# Patient Record
Sex: MALE | Race: BLACK OR AFRICAN AMERICAN | NOT HISPANIC OR LATINO | Employment: UNEMPLOYED | URBAN - METROPOLITAN AREA
[De-identification: names, ages, dates, MRNs, and addresses within clinical notes are randomized per-mention and may not be internally consistent; named-entity substitution may affect disease eponyms.]

---

## 2021-10-07 ENCOUNTER — OFFICE VISIT (OUTPATIENT)
Dept: UROLOGY | Facility: CLINIC | Age: 46
End: 2021-10-07
Payer: MEDICAID

## 2021-10-07 DIAGNOSIS — N52.9 ERECTILE DYSFUNCTION, UNSPECIFIED ERECTILE DYSFUNCTION TYPE: Primary | ICD-10-CM

## 2021-10-07 PROCEDURE — 99204 OFFICE O/P NEW MOD 45 MIN: CPT | Mod: S$GLB,,, | Performed by: NURSE PRACTITIONER

## 2021-10-07 PROCEDURE — 99204 PR OFFICE/OUTPT VISIT, NEW, LEVL IV, 45-59 MIN: ICD-10-PCS | Mod: S$GLB,,, | Performed by: NURSE PRACTITIONER

## 2021-10-08 LAB
E2: 18.8 PG/ML
LH: 5.31 MIU/ML
SHBG: 60.8 NMOL/L (ref 16.5–55.9)
TESTOST SERPL-MCNC: 357 NG/DL (ref 249–836)

## 2021-10-11 ENCOUNTER — TELEPHONE (OUTPATIENT)
Dept: UROLOGY | Facility: CLINIC | Age: 46
End: 2021-10-11

## 2021-11-29 ENCOUNTER — TELEPHONE (OUTPATIENT)
Dept: UROLOGY | Facility: CLINIC | Age: 46
End: 2021-11-29
Payer: MEDICAID

## 2021-12-09 RX ORDER — SILDENAFIL 100 MG/1
100 TABLET, FILM COATED ORAL
Qty: 15 TABLET | Refills: 2 | Status: SHIPPED | OUTPATIENT
Start: 2021-12-09 | End: 2022-01-06 | Stop reason: SDUPTHER

## 2022-01-06 ENCOUNTER — TELEPHONE (OUTPATIENT)
Dept: UROLOGY | Facility: CLINIC | Age: 47
End: 2022-01-06
Payer: MEDICAID

## 2022-01-06 RX ORDER — SILDENAFIL 100 MG/1
100 TABLET, FILM COATED ORAL
Qty: 15 TABLET | Refills: 2 | Status: SHIPPED | OUTPATIENT
Start: 2022-01-06 | End: 2022-02-10 | Stop reason: SDUPTHER

## 2022-01-06 NOTE — TELEPHONE ENCOUNTER
----- Message from Dominique Arvizu sent at 1/5/2022  3:48 PM CST -----    ----- Message -----  From: Radha Ng  Sent: 1/5/2022  10:20 AM CST  To: [facility] Staff    Type:  RX Refill Request    Who Called: pt  Refill or New Rx:refill  RX Name and Strength:sildenafiL (VIAGRA) 100 MG tablet  How is the patient currently taking it? (ex. 1XDay):?  Is this a 30 day or 90 day RX:he would like a 90 day supply  Preferred Pharmacy with phone number:.  [facility] [place] [facility] [address] [address]  Phone: [phone] Fax: [phone]  Local or Mail Order:local  Ordering Provider:Dr ELAINA Maxwell  Would the patient rather a call back or a response via [facility]? call  Best Call Back Number:his phone is broken  Additional Information: .    Thank you

## 2022-02-10 RX ORDER — SILDENAFIL 100 MG/1
100 TABLET, FILM COATED ORAL
Qty: 15 TABLET | Refills: 2 | Status: SHIPPED | OUTPATIENT
Start: 2022-02-10 | End: 2022-05-20

## 2022-02-10 NOTE — TELEPHONE ENCOUNTER
----- Message from Mehnaz Mckeon sent at 2/10/2022  9:25 AM CST -----  Contact: Patient  Type:  RX Refill Request    Who Called: Erik Patel  Refill or New Rx:refill  RX Name and Strength:sildenafiL (VIAGRA) 100 MG tablet  How is the patient currently taking it? (ex. 1XDay):1x as needed  Is this a 30 day or 90 day RX:90  Preferred Pharmacy with phone number:  [facility] [address] [facility] [address] [address]  Phone: [phone] Fax: [phone]  Local or Mail Order:local  Ordering Provider:Tone Maxwell  Would the patient rather a call back or a response via [facility]? Call back  Best Call Back Number:[phone]  Additional Information: Patient states he got only 15 pills his last refill.

## 2022-04-20 ENCOUNTER — TELEPHONE (OUTPATIENT)
Dept: UROLOGY | Facility: CLINIC | Age: 47
End: 2022-04-20
Payer: MEDICAID

## 2022-04-20 NOTE — TELEPHONE ENCOUNTER
Pt will keep his upcoming apt. ED----- Message from Gisell Gómez sent at 4/20/2022  9:59 AM CDT -----  Erik Patel stated that he lives far away and will have trouble getting transportation to his appointment on 05/12.     He would like to know if he can change his appointment to a virtual or audio visit instead. He said he really need his refills. Please give him a call back at [phone] (home)

## 2022-05-11 ENCOUNTER — TELEPHONE (OUTPATIENT)
Dept: UROLOGY | Facility: CLINIC | Age: 47
End: 2022-05-11
Payer: MEDICAID

## 2022-05-13 ENCOUNTER — TELEPHONE (OUTPATIENT)
Dept: UROLOGY | Facility: CLINIC | Age: 47
End: 2022-05-13
Payer: MEDICAID

## 2022-05-13 ENCOUNTER — OFFICE VISIT (OUTPATIENT)
Dept: UROLOGY | Facility: CLINIC | Age: 47
End: 2022-05-13
Payer: MEDICAID

## 2022-05-13 VITALS
DIASTOLIC BLOOD PRESSURE: 79 MMHG | SYSTOLIC BLOOD PRESSURE: 111 MMHG | HEART RATE: 91 BPM | RESPIRATION RATE: 18 BRPM | TEMPERATURE: 98 F

## 2022-05-13 DIAGNOSIS — N52.9 ERECTILE DYSFUNCTION, UNSPECIFIED ERECTILE DYSFUNCTION TYPE: Primary | ICD-10-CM

## 2022-05-13 PROCEDURE — 3074F PR MOST RECENT SYSTOLIC BLOOD PRESSURE < 130 MM HG: ICD-10-PCS | Mod: CPTII,S$GLB,, | Performed by: NURSE PRACTITIONER

## 2022-05-13 PROCEDURE — 1160F RVW MEDS BY RX/DR IN RCRD: CPT | Mod: CPTII,S$GLB,, | Performed by: NURSE PRACTITIONER

## 2022-05-13 PROCEDURE — 99213 OFFICE O/P EST LOW 20 MIN: CPT | Mod: S$GLB,,, | Performed by: NURSE PRACTITIONER

## 2022-05-13 PROCEDURE — 1159F PR MEDICATION LIST DOCUMENTED IN MEDICAL RECORD: ICD-10-PCS | Mod: CPTII,S$GLB,, | Performed by: NURSE PRACTITIONER

## 2022-05-13 PROCEDURE — 3074F SYST BP LT 130 MM HG: CPT | Mod: CPTII,S$GLB,, | Performed by: NURSE PRACTITIONER

## 2022-05-13 PROCEDURE — 3078F PR MOST RECENT DIASTOLIC BLOOD PRESSURE < 80 MM HG: ICD-10-PCS | Mod: CPTII,S$GLB,, | Performed by: NURSE PRACTITIONER

## 2022-05-13 PROCEDURE — 1159F MED LIST DOCD IN RCRD: CPT | Mod: CPTII,S$GLB,, | Performed by: NURSE PRACTITIONER

## 2022-05-13 PROCEDURE — 1160F PR REVIEW ALL MEDS BY PRESCRIBER/CLIN PHARMACIST DOCUMENTED: ICD-10-PCS | Mod: CPTII,S$GLB,, | Performed by: NURSE PRACTITIONER

## 2022-05-13 PROCEDURE — 3078F DIAST BP <80 MM HG: CPT | Mod: CPTII,S$GLB,, | Performed by: NURSE PRACTITIONER

## 2022-05-13 PROCEDURE — 99213 PR OFFICE/OUTPT VISIT, EST, LEVL III, 20-29 MIN: ICD-10-PCS | Mod: S$GLB,,, | Performed by: NURSE PRACTITIONER

## 2022-05-13 NOTE — PROGRESS NOTES
Subjective:       Patient ID: Erik Patel is a 47 y.o. male.    Chief Complaint: Erectile Dysfunction (Wanting a stronger ed medication.)      HPI: 39-year-old male, established patient, last seen October 2021.  Patient has erectile dysfunction.  He previously failed oral medications.  With start the patient on TriMix injections.  He was started on 30/0.5/10.  Patient states was working well for him.  However recently, it has become less and effective.  He has increased the amount he injects.  He started off on 10 units and has titrated up to 30 units.    Again the patient has failed oral medications.  He would like to try increasing dosage.  He has poor coverage for penile prosthesis.  No other urinary complaints at this time.         Past Medical History:   Past Medical History:   Diagnosis Date    ED (erectile dysfunction)        Past Surgical Historical:   Past Surgical History:   Procedure Laterality Date    HERNIA REPAIR          Medications:   Medication List with Changes/Refills   Current Medications    ALPROSTADIL 500 MCG/ML SOLN 50 MCG, PHENTOLAMINE 5 MG SOLR 5 MG, PAPAVERINE 30 MG/ML SOLN 30 MCG    by Intracavernosal route.    SILDENAFIL (VIAGRA) 100 MG TABLET    Take 1 tablet (100 mg total) by mouth as needed (prior to activity).        Past Social History:   Social History     Socioeconomic History    Marital status: Single   Tobacco Use    Smoking status: Current Every Day Smoker     Types: Cigarettes    Smokeless tobacco: Never Used   Substance and Sexual Activity    Alcohol use: Never    Drug use: Yes    Sexual activity: Not Currently       Allergies: Review of patient's allergies indicates:  No Known Allergies     Family History:   Family History   Problem Relation Age of Onset    No Known Problems Father     No Known Problems Mother         Review of Systems:  Review of Systems   Constitutional: Negative for activity change and appetite change.   HENT: Negative for congestion and  dental problem.    Respiratory: Negative for chest tightness and shortness of breath.    Cardiovascular: Negative for chest pain.   Gastrointestinal: Negative for abdominal distention and abdominal pain.   Genitourinary: Negative for decreased urine volume, difficulty urinating, dysuria, enuresis, flank pain, frequency, genital sores, hematuria, penile discharge, penile pain, penile swelling, scrotal swelling, testicular pain and urgency.   Musculoskeletal: Negative for back pain and neck pain.   Neurological: Negative for dizziness.   Hematological: Negative for adenopathy.   Psychiatric/Behavioral: Negative for agitation, behavioral problems and confusion.       Physical Exam:  Physical Exam  Vitals and nursing note reviewed.   Constitutional:       Appearance: He is well-developed.   HENT:      Head: Normocephalic.   Cardiovascular:      Rate and Rhythm: Normal rate and regular rhythm.      Heart sounds: Normal heart sounds.   Pulmonary:      Effort: Pulmonary effort is normal.      Breath sounds: Normal breath sounds.   Abdominal:      General: Bowel sounds are normal.      Palpations: Abdomen is soft.   Skin:     General: Skin is warm and dry.   Neurological:      Mental Status: He is alert and oriented to person, place, and time.       Urinalysis:  Normal    Assessment/Plan:   Erectile dysfunction:  Patient has been on TriMix injections.  He was on low-dose, 30/0.5/10.  He is having left affecting his even with increasing amount injected.  Will increase patient's TriMix.  Will increase to 30/1/20.  Patient will notify us if there is no improvement.  Also discussed vacuum pumps and penile rings.    Will plan follow-up in 1 year, sooner if needed.  Problem List Items Addressed This Visit    None     Visit Diagnoses     Erectile dysfunction, unspecified erectile dysfunction type    -  Primary    Relevant Orders    POCT Urinalysis (w/Micro Option)

## 2022-05-13 NOTE — TELEPHONE ENCOUNTER
----- Message from Marina Aviles sent at 5/13/2022  9:41 AM CDT -----  Regarding: same day appt access  Contact: Pt  Type:  Same Day Appointment Request    Caller is requesting a same day appointment.  Caller declined first available appointment listed below.    Name of Caller: Erik Patel   When is the first available appointment?   Symptoms: 6 mo fu  Best Call Back Number: [phone]  Additional Information:  Pt states that someone from the office called him on 05/06/22 to reschedule his appt for today at 2:40pm. Pt states that he took off from work today and now he finds out that is was scheduled for yesterday. Pt is requesting an appt for today around 2pm. Pt is unable to come in any earlier due to transportation and he lives in [place].

## 2022-06-10 ENCOUNTER — HOSPITAL ENCOUNTER (EMERGENCY)
Facility: HOSPITAL | Age: 47
Discharge: HOME | End: 2022-06-10
Attending: SPECIALIST
Payer: MEDICAID

## 2022-06-10 VITALS
WEIGHT: 235 LBS | BODY MASS INDEX: 32.9 KG/M2 | SYSTOLIC BLOOD PRESSURE: 131 MMHG | TEMPERATURE: 98 F | HEIGHT: 71 IN | OXYGEN SATURATION: 99 % | DIASTOLIC BLOOD PRESSURE: 77 MMHG | RESPIRATION RATE: 20 BRPM | HEART RATE: 93 BPM

## 2022-06-10 DIAGNOSIS — R10.31 RIGHT GROIN PAIN: Primary | ICD-10-CM

## 2022-06-10 PROCEDURE — 25000003 PHARM REV CODE 250: Performed by: SPECIALIST

## 2022-06-10 PROCEDURE — 99283 EMERGENCY DEPT VISIT LOW MDM: CPT | Mod: 25

## 2022-06-10 RX ORDER — KETOROLAC TROMETHAMINE 10 MG/1
10 TABLET, FILM COATED ORAL
Status: COMPLETED | OUTPATIENT
Start: 2022-06-10 | End: 2022-06-10

## 2022-06-10 RX ORDER — CYCLOBENZAPRINE HCL 10 MG
10 TABLET ORAL
Status: COMPLETED | OUTPATIENT
Start: 2022-06-10 | End: 2022-06-10

## 2022-06-10 RX ORDER — DICLOFENAC SODIUM 50 MG/1
50 TABLET, DELAYED RELEASE ORAL 3 TIMES DAILY PRN
Qty: 30 TABLET | Refills: 0 | OUTPATIENT
Start: 2022-06-10 | End: 2022-09-04

## 2022-06-10 RX ADMIN — KETOROLAC TROMETHAMINE 10 MG: 10 TABLET, FILM COATED ORAL at 09:06

## 2022-06-10 RX ADMIN — CYCLOBENZAPRINE 10 MG: 10 TABLET, FILM COATED ORAL at 09:06

## 2022-06-11 NOTE — ED PROVIDER NOTES
Encounter Date: 6/10/2022       History     Chief Complaint   Patient presents with    Groin Pain     Right groin pain onset last night pt states on going process for past three years.     Patient reports a right inguinal hernia repair 3 years ago with mesh and has had pain on and off for 3 years; no fever or chills and no bulging at the site of the repair    The history is provided by the patient.     Review of patient's allergies indicates:  No Known Allergies  Past Medical History:   Diagnosis Date    ED (erectile dysfunction)      Past Surgical History:   Procedure Laterality Date    HERNIA REPAIR       Family History   Problem Relation Age of Onset    No Known Problems Father     No Known Problems Mother      Social History     Tobacco Use    Smoking status: Current Every Day Smoker     Types: Cigarettes    Smokeless tobacco: Never Used   Substance Use Topics    Alcohol use: Never    Drug use: Yes     Review of Systems   Constitutional: Negative for fever.   HENT: Negative for sore throat.    Respiratory: Negative for shortness of breath.    Cardiovascular: Negative for chest pain.   Gastrointestinal: Negative for nausea.        Right groin pain   Genitourinary: Negative for dysuria.   Musculoskeletal: Negative for back pain.   Skin: Negative for rash.   Neurological: Negative for weakness.   Hematological: Does not bruise/bleed easily.       Physical Exam     Initial Vitals [06/10/22 1926]   BP Pulse Resp Temp SpO2   131/77 93 20 98.1 °F (36.7 °C) 99 %      MAP       --         Physical Exam    Nursing note and vitals reviewed.  Constitutional: He appears well-developed and well-nourished.   HENT:   Head: Normocephalic and atraumatic.   Eyes: EOM are normal. Pupils are equal, round, and reactive to light.   Cardiovascular: Normal rate, regular rhythm and normal heart sounds.   Pulmonary/Chest: Breath sounds normal.   Abdominal: Abdomen is soft. Bowel sounds are normal.   Right groin tender to  palpation, no swelling or mass palpated, no erythema   Musculoskeletal:         General: Normal range of motion.     Neurological: He is alert and oriented to person, place, and time.   Skin: Skin is warm and dry.         ED Course   Procedures  Labs Reviewed - No data to display       Imaging Results    None          Medications   ketorolac tablet 10 mg (10 mg Oral Given 6/10/22 2116)   cyclobenzaprine tablet 10 mg (10 mg Oral Given 6/10/22 2116)                          Clinical Impression:   Final diagnoses:  [R10.31] Right groin pain (Primary)          ED Disposition Condition    Discharge Stable        ED Prescriptions     Medication Sig Dispense Start Date End Date Auth. Provider    diclofenac (VOLTAREN) 50 MG EC tablet Take 1 tablet (50 mg total) by mouth 3 (three) times daily as needed. 30 tablet 6/10/2022  Cisco Wilburn MD        Follow-up Information     Follow up With Specialties Details Why Contact Info    MONIE Vásquez Nurse Practitioner In 1 week  [address] [place] [id number] [phone]             Cisco Wilburn MD  06/11/22 0395

## 2022-06-13 DIAGNOSIS — Z00.00 WELLNESS EXAMINATION: Primary | ICD-10-CM

## 2022-06-14 ENCOUNTER — HOSPITAL ENCOUNTER (EMERGENCY)
Facility: HOSPITAL | Age: 47
Discharge: HOME | End: 2022-06-14
Attending: FAMILY MEDICINE
Payer: MEDICAID

## 2022-06-14 VITALS
HEIGHT: 71 IN | SYSTOLIC BLOOD PRESSURE: 113 MMHG | BODY MASS INDEX: 32.48 KG/M2 | OXYGEN SATURATION: 100 % | WEIGHT: 232 LBS | TEMPERATURE: 98 F | HEART RATE: 67 BPM | RESPIRATION RATE: 20 BRPM | DIASTOLIC BLOOD PRESSURE: 74 MMHG

## 2022-06-14 DIAGNOSIS — R10.31 RIGHT GROIN PAIN: Primary | ICD-10-CM

## 2022-06-14 DIAGNOSIS — R91.1 PULMONARY NODULE, RIGHT: ICD-10-CM

## 2022-06-14 LAB
ALBUMIN SERPL-MCNC: 4.1 GM/DL (ref 3.5–5)
ALBUMIN/GLOB SERPL: 1.6 RATIO (ref 1.1–2)
ALP SERPL-CCNC: 75 UNIT/L (ref 40–150)
ALT SERPL-CCNC: 20 UNIT/L (ref 0–55)
APPEARANCE UR: CLEAR
AST SERPL-CCNC: 18 UNIT/L (ref 5–34)
BACTERIA #/AREA URNS AUTO: NORMAL /HPF
BASOPHILS # BLD AUTO: 0.05 X10(3)/MCL (ref 0–0.2)
BASOPHILS NFR BLD AUTO: 0.7 %
BILIRUB UR QL STRIP.AUTO: NEGATIVE MG/DL
BILIRUBIN DIRECT+TOT PNL SERPL-MCNC: 0.5 MG/DL
BUN SERPL-MCNC: 16.2 MG/DL (ref 8.9–20.6)
CALCIUM SERPL-MCNC: 9.3 MG/DL (ref 8.4–10.2)
CHLORIDE SERPL-SCNC: 108 MMOL/L (ref 98–107)
CO2 SERPL-SCNC: 27 MMOL/L (ref 22–29)
COLOR UR AUTO: YELLOW
CREAT SERPL-MCNC: 0.84 MG/DL (ref 0.73–1.18)
EOSINOPHIL # BLD AUTO: 0.25 X10(3)/MCL (ref 0–0.9)
EOSINOPHIL NFR BLD AUTO: 3.6 %
ERYTHROCYTE [DISTWIDTH] IN BLOOD BY AUTOMATED COUNT: 12.6 % (ref 11.5–17)
GLOBULIN SER-MCNC: 2.6 GM/DL (ref 2.4–3.5)
GLUCOSE SERPL-MCNC: 104 MG/DL (ref 74–100)
GLUCOSE UR QL STRIP.AUTO: NEGATIVE MG/DL
HCT VFR BLD AUTO: 41.6 % (ref 42–52)
HGB BLD-MCNC: 14.2 GM/DL (ref 14–18)
IMM GRANULOCYTES # BLD AUTO: 0.01 X10(3)/MCL (ref 0–0.02)
IMM GRANULOCYTES NFR BLD AUTO: 0.1 % (ref 0–0.43)
KETONES UR QL STRIP.AUTO: NEGATIVE MG/DL
LEUKOCYTE ESTERASE UR QL STRIP.AUTO: NEGATIVE UNIT/L
LYMPHOCYTES # BLD AUTO: 1.9 X10(3)/MCL (ref 0.6–4.6)
LYMPHOCYTES NFR BLD AUTO: 27 %
MCH RBC QN AUTO: 28.7 PG (ref 27–31)
MCHC RBC AUTO-ENTMCNC: 34.1 MG/DL (ref 33–36)
MCV RBC AUTO: 84 FL (ref 80–94)
MONOCYTES # BLD AUTO: 0.55 X10(3)/MCL (ref 0.1–1.3)
MONOCYTES NFR BLD AUTO: 7.8 %
NEUTROPHILS # BLD AUTO: 4.3 X10(3)/MCL (ref 2.1–9.2)
NEUTROPHILS NFR BLD AUTO: 60.8 %
NITRITE UR QL STRIP.AUTO: NEGATIVE
PH UR STRIP.AUTO: 6.5 [PH]
PLATELET # BLD AUTO: 282 X10(3)/MCL (ref 130–400)
PMV BLD AUTO: 9.3 FL (ref 9.4–12.4)
POTASSIUM SERPL-SCNC: 4.4 MMOL/L (ref 3.5–5.1)
PROT SERPL-MCNC: 6.7 GM/DL (ref 6.4–8.3)
PROT UR QL STRIP.AUTO: NEGATIVE MG/DL
RBC # BLD AUTO: 4.95 X10(6)/MCL (ref 4.7–6.1)
RBC #/AREA URNS AUTO: NORMAL /HPF
RBC UR QL AUTO: NEGATIVE UNIT/L
SODIUM SERPL-SCNC: 142 MMOL/L (ref 136–145)
SP GR UR STRIP.AUTO: >=1.03
SQUAMOUS #/AREA URNS AUTO: NORMAL /LPF
UROBILINOGEN UR STRIP-ACNC: 1 MG/DL
WBC # SPEC AUTO: 7 X10(3)/MCL (ref 4.5–11.5)
WBC #/AREA URNS AUTO: NORMAL /HPF

## 2022-06-14 PROCEDURE — 80053 COMPREHEN METABOLIC PANEL: CPT | Performed by: FAMILY MEDICINE

## 2022-06-14 PROCEDURE — 81001 URINALYSIS AUTO W/SCOPE: CPT | Performed by: FAMILY MEDICINE

## 2022-06-14 PROCEDURE — 63600175 PHARM REV CODE 636 W HCPCS: Performed by: FAMILY MEDICINE

## 2022-06-14 PROCEDURE — 25500020 PHARM REV CODE 255: Performed by: FAMILY MEDICINE

## 2022-06-14 PROCEDURE — 85025 COMPLETE CBC W/AUTO DIFF WBC: CPT | Performed by: FAMILY MEDICINE

## 2022-06-14 PROCEDURE — 99285 EMERGENCY DEPT VISIT HI MDM: CPT | Mod: 25

## 2022-06-14 PROCEDURE — 36415 COLL VENOUS BLD VENIPUNCTURE: CPT | Performed by: FAMILY MEDICINE

## 2022-06-14 RX ORDER — KETOROLAC TROMETHAMINE 30 MG/ML
30 INJECTION, SOLUTION INTRAMUSCULAR; INTRAVENOUS
Status: COMPLETED | OUTPATIENT
Start: 2022-06-14 | End: 2022-06-14

## 2022-06-14 RX ORDER — KETOROLAC TROMETHAMINE 30 MG/ML
30 INJECTION, SOLUTION INTRAMUSCULAR; INTRAVENOUS
Status: DISCONTINUED | OUTPATIENT
Start: 2022-06-14 | End: 2022-06-14

## 2022-06-14 RX ORDER — KETOROLAC TROMETHAMINE 10 MG/1
10 TABLET, FILM COATED ORAL EVERY 6 HOURS
Qty: 15 TABLET | Refills: 0 | Status: SHIPPED | OUTPATIENT
Start: 2022-06-14 | End: 2022-06-19

## 2022-06-14 RX ADMIN — KETOROLAC TROMETHAMINE 30 MG: 30 INJECTION, SOLUTION INTRAMUSCULAR at 08:06

## 2022-06-14 RX ADMIN — IOPAMIDOL 100 ML: 755 INJECTION, SOLUTION INTRAVENOUS at 09:06

## 2022-06-14 NOTE — DISCHARGE INSTRUCTIONS
Follow-up with his surgeon on the right groin pain for possible irritation from the mesh lining of the hernia repair.  Follow-up with primary care for repeat CT scan on pulmonary nodule and follow-up on that.

## 2022-06-14 NOTE — ED PROVIDER NOTES
Encounter Date: 6/14/2022       History     Chief Complaint   Patient presents with    Abdominal Pain    rlq abd/groin pain     C/o pain to rlq/groin for pain off and on for last 3 years/ worsening  pain/ same as when he had hernia but no buldge/ came Friday but pain is not better       Abdominal Pain  The current episode started several weeks ago. The problem has been gradually worsening. The abdominal pain is located in the groin. The abdominal pain does not radiate. The severity of the abdominal pain is 5/10. The abdominal pain is relieved by nothing. The other symptoms of the illness do not include fever, shortness of breath, nausea or dysuria.   The patient states that she believes she is currently not pregnant. The patient has not had a change in bowel habit. Symptoms associated with the illness do not include heartburn, constipation, urgency, hematuria, frequency or back pain. Significant associated medical issues include diverticulitis. Significant associated medical issues do not include inflammatory bowel disease or gallstones.     Review of patient's allergies indicates:  No Known Allergies  Past Medical History:   Diagnosis Date    ED (erectile dysfunction)      Past Surgical History:   Procedure Laterality Date    HERNIA REPAIR       Family History   Problem Relation Age of Onset    No Known Problems Father     No Known Problems Mother      Social History     Tobacco Use    Smoking status: Current Every Day Smoker     Types: Cigarettes    Smokeless tobacco: Never Used   Substance Use Topics    Alcohol use: Never    Drug use: Yes     Review of Systems   Constitutional: Negative for fever.   HENT: Negative for sore throat.    Respiratory: Negative for shortness of breath.    Cardiovascular: Negative for chest pain.   Gastrointestinal: Positive for abdominal pain. Negative for constipation, heartburn and nausea.   Genitourinary: Negative for dysuria, frequency, hematuria and urgency.    Musculoskeletal: Negative for back pain.   Skin: Negative for rash.   Neurological: Negative for weakness.   Hematological: Does not bruise/bleed easily.   All other systems reviewed and are negative.      Physical Exam     Initial Vitals [06/14/22 0744]   BP Pulse Resp Temp SpO2   (!) 144/84 89 20 98.4 °F (36.9 °C) 99 %      MAP       --         Physical Exam    Nursing note and vitals reviewed.  Constitutional: He appears well-developed and well-nourished. He is active.   HENT:   Head: Normocephalic and atraumatic.   Eyes: Conjunctivae, EOM and lids are normal. Pupils are equal, round, and reactive to light.   Neck: Trachea normal and phonation normal. Neck supple. No thyroid mass present.   Normal range of motion.  Cardiovascular: Normal rate, regular rhythm, normal heart sounds and normal pulses.   Pulmonary/Chest: Breath sounds normal.   Abdominal: Abdomen is soft. Bowel sounds are normal.   Genitourinary:    Genitourinary Comments: No palpable hernia on the right or left side     Musculoskeletal:         General: Normal range of motion.      Cervical back: Normal range of motion and neck supple.     Neurological: He is alert and oriented to person, place, and time. He has normal strength and normal reflexes.   Skin: Skin is warm, dry and intact.   Psychiatric: He has a normal mood and affect. His speech is normal and behavior is normal. Judgment and thought content normal. Cognition and memory are normal.         ED Course   Procedures  Labs Reviewed   COMPREHENSIVE METABOLIC PANEL - Abnormal; Notable for the following components:       Result Value    Chloride 108 (*)     Glucose Level 104 (*)     All other components within normal limits   CBC WITH DIFFERENTIAL - Abnormal; Notable for the following components:    Hct 41.6 (*)     MPV 9.3 (*)     All other components within normal limits   URINALYSIS, MICROSCOPIC - Normal   URINALYSIS, REFLEX TO URINE CULTURE   CBC W/ AUTO DIFFERENTIAL    Narrative:     The  following orders were created for panel order CBC auto differential.  Procedure                               Abnormality         Status                     ---------                               -----------         ------                     CBC with Differential[[id number]]        Abnormal            Final result                 Please view results for these tests on the individual orders.          Imaging Results          CT Abdomen Pelvis With Contrast (Final result)  Result time 06/14/22 09:55:08    Final result by Mere Hill MD (06/14/22 09:55:08)                 Impression:      1. Small to moderate fat containing umbilical hernia  2. Postsurgical changes of right inguinal hernia repair.  3. Trace fat at the medial right inguinal canal is nonspecific.  This appears to been have described on the prior CT report as mild fatty protrusion at the inguinal region.  4. Nonspecific prostatic calcifications  5. Left lower lobe pulmonary nodule..  See Fleischner recommendations below:  Fleischner criteria for nodules 6-8 mm:    Single nodule in a low risk patient- CT at 6-12 month, then consider CT at 18-24 months    Single nodule in a high risk patient- CT at 6-12 months and 18-24 months.    Reference: 2017 Fleischner Society Guidelines for Management of Incidentally Discovered Pulmonary Nodules.        Electronically signed by: Mere Hill  Date:    06/14/2022  Time:    09:55             Narrative:    EXAMINATION:  CT ABDOMEN PELVIS WITH CONTRAST    CLINICAL HISTORY:  RLQ abdominal pain (Age >= 14y);r/o hernia;  abdomen and groin pain intermittently x3 years.    TECHNIQUE:  Helically acquired images with axial, sagittal and coronal reformations were obtained from the lung bases to the pubic symphysis after the IV administration of contrast.    Automated tube current modulation, weight-based exposure dosing, and/or iterative reconstruction technique utilized to reach lowest reasonably achievable  exposure rate.    DLP: 1486 mGy*cm    COMPARISON:  Report from CT pelvis dated 02/20/2019 was reviewed.  These images were not available in epic PACS for viewing.    FINDINGS:  HEART: Normal in size. No pericardial effusion.    LUNG BASES: 6 mm subpleural left lower lobe pulmonary nodule.    LIVER: Normal attenuation. No appreciable focal hepatic lesion.    BILIARY: No calcified gallstones.    PANCREAS: No inflammatory change.    SPLEEN: Normal in size    ADRENALS: No mass.    KIDNEYS/URETERS: Subcentimeter right renal cortical hypodensities are too small to accurately characterize.  No hydronephrosis.  No perinephric collection.  Kidneys enhance symmetrically.    GI TRACT/MESENTERY:  No evidence of bowel obstruction or inflammation. The appendix is normal.  Normal CT appearance of the colon.    PERITONEUM: No free fluid.No free air.    LYMPH NODES: No enlarged lymph nodes by size criteria.    VASCULATURE: Mild aortoiliac atherosclerosis without aneurysm.    BLADDER: Normal appearance given degree of distention.    REPRODUCTIVE ORGANS: There are small prostatic calcifications.    SOFT TISSUES: Small to moderate fat containing umbilical hernia.  There are postsurgical changes in the right inguinal region compatible with hernia repair.  Trace fat at the medial inguinal ring on the right is nonspecific.    BONES: No acute osseous abnormality.  Mild lower lumbar spondylosis.                                 Medications   ketorolac injection 30 mg (30 mg Intravenous Given 6/14/22 0753)   iopamidoL (ISOVUE-370) injection 100 mL (100 mLs Intravenous Given 6/14/22 0935)                          Clinical Impression:   Final diagnoses:  [R10.31] Right groin pain (Primary)  [R91.1] Pulmonary nodule, right          ED Disposition Condition    Discharge Stable        ED Prescriptions     Medication Sig Dispense Start Date End Date Auth. Provider    ketorolac (TORADOL) 10 mg tablet Take 1 tablet (10 mg total) by mouth every 6  (six) hours. for 5 days 15 tablet 6/14/2022 6/19/2022 Josse Roth MD        Follow-up Information     Follow up With Specialties Details Why Contact Info    [facility] - Emergency Dept Emergency Medicine  If symptoms worsen [address] [facility] [address] [facility] [id number]  [phone]           Josse Roth MD  06/14/22 1017

## 2022-06-20 ENCOUNTER — LAB VISIT (OUTPATIENT)
Dept: LAB | Facility: HOSPITAL | Age: 47
End: 2022-06-20
Attending: NURSE PRACTITIONER
Payer: MEDICAID

## 2022-06-20 DIAGNOSIS — Z00.00 WELLNESS EXAMINATION: ICD-10-CM

## 2022-06-20 LAB
ALBUMIN SERPL-MCNC: 4 GM/DL (ref 3.5–5)
ALBUMIN/GLOB SERPL: 1.4 RATIO (ref 1.1–2)
ALP SERPL-CCNC: 58 UNIT/L (ref 40–150)
ALT SERPL-CCNC: 23 UNIT/L (ref 0–55)
AST SERPL-CCNC: 18 UNIT/L (ref 5–34)
BASOPHILS # BLD AUTO: 0.05 X10(3)/MCL (ref 0–0.2)
BASOPHILS NFR BLD AUTO: 0.7 %
BILIRUBIN DIRECT+TOT PNL SERPL-MCNC: 0.7 MG/DL
BUN SERPL-MCNC: 15.9 MG/DL (ref 8.9–20.6)
CALCIUM SERPL-MCNC: 9.1 MG/DL (ref 8.4–10.2)
CHLORIDE SERPL-SCNC: 106 MMOL/L (ref 98–107)
CHOLEST SERPL-MCNC: 166 MG/DL
CHOLEST/HDLC SERPL: 2 {RATIO} (ref 0–5)
CO2 SERPL-SCNC: 27 MMOL/L (ref 22–29)
CREAT SERPL-MCNC: 0.86 MG/DL (ref 0.73–1.18)
DEPRECATED CALCIDIOL+CALCIFEROL SERPL-MC: 19.9 NG/ML (ref 30–80)
EOSINOPHIL # BLD AUTO: 0.15 X10(3)/MCL (ref 0–0.9)
EOSINOPHIL NFR BLD AUTO: 2.2 %
ERYTHROCYTE [DISTWIDTH] IN BLOOD BY AUTOMATED COUNT: 12.5 % (ref 11.5–17)
EST. AVERAGE GLUCOSE BLD GHB EST-MCNC: 76.7 MG/DL
GLOBULIN SER-MCNC: 2.8 GM/DL (ref 2.4–3.5)
GLUCOSE SERPL-MCNC: 82 MG/DL (ref 74–100)
HBA1C MFR BLD: 4.3 %
HCT VFR BLD AUTO: 40.2 % (ref 42–52)
HDLC SERPL-MCNC: 69 MG/DL (ref 35–60)
HGB BLD-MCNC: 13.9 GM/DL (ref 14–18)
IMM GRANULOCYTES # BLD AUTO: 0.01 X10(3)/MCL (ref 0–0.02)
IMM GRANULOCYTES NFR BLD AUTO: 0.1 % (ref 0–0.43)
LDLC SERPL CALC-MCNC: 86 MG/DL (ref 50–140)
LYMPHOCYTES # BLD AUTO: 1.61 X10(3)/MCL (ref 0.6–4.6)
LYMPHOCYTES NFR BLD AUTO: 24.1 %
MCH RBC QN AUTO: 29 PG (ref 27–31)
MCHC RBC AUTO-ENTMCNC: 34.6 MG/DL (ref 33–36)
MCV RBC AUTO: 83.8 FL (ref 80–94)
MONOCYTES # BLD AUTO: 0.51 X10(3)/MCL (ref 0.1–1.3)
MONOCYTES NFR BLD AUTO: 7.6 %
NEUTROPHILS # BLD AUTO: 4.4 X10(3)/MCL (ref 2.1–9.2)
NEUTROPHILS NFR BLD AUTO: 65.3 %
PLATELET # BLD AUTO: 254 X10(3)/MCL (ref 130–400)
PMV BLD AUTO: 8.7 FL (ref 9.4–12.4)
POTASSIUM SERPL-SCNC: 4 MMOL/L (ref 3.5–5.1)
PROT SERPL-MCNC: 6.8 GM/DL (ref 6.4–8.3)
RBC # BLD AUTO: 4.8 X10(6)/MCL (ref 4.7–6.1)
SODIUM SERPL-SCNC: 141 MMOL/L (ref 136–145)
TRIGL SERPL-MCNC: 57 MG/DL (ref 34–140)
TSH SERPL-ACNC: 1.69 UIU/ML (ref 0.35–4.94)
VLDLC SERPL CALC-MCNC: 11 MG/DL
WBC # SPEC AUTO: 6.7 X10(3)/MCL (ref 4.5–11.5)

## 2022-06-20 PROCEDURE — 80061 LIPID PANEL: CPT

## 2022-06-20 PROCEDURE — 84443 ASSAY THYROID STIM HORMONE: CPT

## 2022-06-20 PROCEDURE — 82306 VITAMIN D 25 HYDROXY: CPT

## 2022-06-20 PROCEDURE — 83036 HEMOGLOBIN GLYCOSYLATED A1C: CPT

## 2022-06-20 PROCEDURE — 80053 COMPREHEN METABOLIC PANEL: CPT

## 2022-06-20 PROCEDURE — 36415 COLL VENOUS BLD VENIPUNCTURE: CPT

## 2022-06-20 PROCEDURE — 85025 COMPLETE CBC W/AUTO DIFF WBC: CPT

## 2022-06-21 ENCOUNTER — OFFICE VISIT (OUTPATIENT)
Dept: FAMILY MEDICINE | Facility: CLINIC | Age: 47
End: 2022-06-21
Payer: MEDICAID

## 2022-06-21 VITALS
HEIGHT: 71 IN | HEART RATE: 77 BPM | SYSTOLIC BLOOD PRESSURE: 106 MMHG | WEIGHT: 239 LBS | RESPIRATION RATE: 18 BRPM | BODY MASS INDEX: 33.46 KG/M2 | OXYGEN SATURATION: 98 % | TEMPERATURE: 98 F | DIASTOLIC BLOOD PRESSURE: 73 MMHG

## 2022-06-21 DIAGNOSIS — E55.9 VITAMIN D DEFICIENCY: ICD-10-CM

## 2022-06-21 DIAGNOSIS — K40.90 RIGHT INGUINAL HERNIA: Primary | ICD-10-CM

## 2022-06-21 DIAGNOSIS — Z00.00 WELLNESS EXAMINATION: ICD-10-CM

## 2022-06-21 PROCEDURE — 3008F PR BODY MASS INDEX (BMI) DOCUMENTED: ICD-10-PCS | Mod: CPTII,,, | Performed by: NURSE PRACTITIONER

## 2022-06-21 PROCEDURE — 3078F DIAST BP <80 MM HG: CPT | Mod: CPTII,,, | Performed by: NURSE PRACTITIONER

## 2022-06-21 PROCEDURE — 3008F BODY MASS INDEX DOCD: CPT | Mod: CPTII,,, | Performed by: NURSE PRACTITIONER

## 2022-06-21 PROCEDURE — 1159F PR MEDICATION LIST DOCUMENTED IN MEDICAL RECORD: ICD-10-PCS | Mod: CPTII,,, | Performed by: NURSE PRACTITIONER

## 2022-06-21 PROCEDURE — 99386 PR PREVENTIVE VISIT,NEW,40-64: ICD-10-PCS | Mod: ,,, | Performed by: NURSE PRACTITIONER

## 2022-06-21 PROCEDURE — 3074F PR MOST RECENT SYSTOLIC BLOOD PRESSURE < 130 MM HG: ICD-10-PCS | Mod: CPTII,,, | Performed by: NURSE PRACTITIONER

## 2022-06-21 PROCEDURE — 1159F MED LIST DOCD IN RCRD: CPT | Mod: CPTII,,, | Performed by: NURSE PRACTITIONER

## 2022-06-21 PROCEDURE — 99386 PREV VISIT NEW AGE 40-64: CPT | Mod: ,,, | Performed by: NURSE PRACTITIONER

## 2022-06-21 PROCEDURE — 3078F PR MOST RECENT DIASTOLIC BLOOD PRESSURE < 80 MM HG: ICD-10-PCS | Mod: CPTII,,, | Performed by: NURSE PRACTITIONER

## 2022-06-21 PROCEDURE — 3074F SYST BP LT 130 MM HG: CPT | Mod: CPTII,,, | Performed by: NURSE PRACTITIONER

## 2022-06-21 RX ORDER — ASPIRIN 325 MG
50000 TABLET, DELAYED RELEASE (ENTERIC COATED) ORAL WEEKLY
Qty: 12 CAPSULE | Refills: 0 | Status: SHIPPED | OUTPATIENT
Start: 2022-06-21 | End: 2022-09-07

## 2022-06-21 RX ORDER — KETOROLAC TROMETHAMINE 10 MG/1
10 TABLET, FILM COATED ORAL EVERY 6 HOURS PRN
COMMUNITY
End: 2022-07-23

## 2022-06-21 NOTE — PROGRESS NOTES
Subjective:       Patient ID: Erik Patel is a 47 y.o. male.    Chief Complaint: Follow-up (ED visit on 6/10 and 6/14/2022 r/t R groin and RLQ abd pain. Pt reports continued pain to area.) and Establish Care    This is a 47-year-old male presents to clinic to establish care.  Patient was seen in the ED with complaints of pain to right inguinal area.  CT abdomen ordered by ER reported postsurgical  changes to right inguinal hernia repair.  Patient presents requesting referral.  Patient denies any changes with bowel.    Review of Systems   Constitutional: Negative.    HENT: Negative.    Eyes: Negative.    Respiratory: Negative.    Cardiovascular: Negative.    Gastrointestinal:        Pain to right inguinal area   Endocrine: Negative.    Genitourinary: Negative.    Musculoskeletal: Negative.    Integumentary:  Negative.   Allergic/Immunologic: Negative.    Neurological: Negative.    Hematological: Negative.    Psychiatric/Behavioral: Negative.          Objective:      Physical Exam  Vitals and nursing note reviewed.   Constitutional:       Appearance: Normal appearance.   HENT:      Head: Normocephalic and atraumatic.      Right Ear: Ear canal and external ear normal.      Left Ear: Ear canal and external ear normal.      Nose: Nose normal.      Mouth/Throat:      Mouth: Mucous membranes are moist.      Pharynx: Oropharynx is clear.   Eyes:      Extraocular Movements: Extraocular movements intact.      Conjunctiva/sclera: Conjunctivae normal.      Pupils: Pupils are equal, round, and reactive to light.   Cardiovascular:      Rate and Rhythm: Normal rate and regular rhythm.      Pulses: Normal pulses.      Heart sounds: Normal heart sounds.   Pulmonary:      Effort: Pulmonary effort is normal.      Breath sounds: Normal breath sounds.   Abdominal:      General: Abdomen is flat. Bowel sounds are normal.      Palpations: Abdomen is soft.      Tenderness: There is abdominal tenderness.      Comments: Tenderness to  right inguinal area with palpation.  No hernia seen.   Musculoskeletal:         General: Normal range of motion.      Cervical back: Normal range of motion and neck supple.   Skin:     General: Skin is warm and dry.      Capillary Refill: Capillary refill takes less than 2 seconds.   Neurological:      General: No focal deficit present.      Mental Status: He is alert and oriented to person, place, and time. Mental status is at baseline.   Psychiatric:         Mood and Affect: Mood normal.         Behavior: Behavior normal.         Thought Content: Thought content normal.         Judgment: Judgment normal.         Assessment:       Problem List Items Addressed This Visit        Endocrine    Vitamin D deficiency       GI    Right inguinal hernia - Primary     Patient reports right inguinal hernia repair done in 2015.  Patient presents with complaints pain and burning to right inguinal area.  CT abdomen performed in ER shows postsurgical changes of right inguinal hernia repair.  Referral sent to General surgery.           Relevant Orders    Ambulatory referral/consult to General Surgery       Other    Wellness examination          Plan:       Return to clinic in 6 months for follow-up appointment.  Follow-up CT lung screening due in 6 months.

## 2022-06-22 ENCOUNTER — PATIENT MESSAGE (OUTPATIENT)
Dept: ADMINISTRATIVE | Facility: HOSPITAL | Age: 47
End: 2022-06-22
Payer: MEDICAID

## 2022-06-23 ENCOUNTER — TELEPHONE (OUTPATIENT)
Dept: UROLOGY | Facility: CLINIC | Age: 47
End: 2022-06-23
Payer: MEDICAID

## 2022-06-23 PROBLEM — K40.90 RIGHT INGUINAL HERNIA: Status: ACTIVE | Noted: 2022-06-23

## 2022-06-23 PROBLEM — E55.9 VITAMIN D DEFICIENCY: Status: ACTIVE | Noted: 2022-06-23

## 2022-06-23 PROBLEM — Z00.00 WELLNESS EXAMINATION: Status: ACTIVE | Noted: 2022-06-23

## 2022-06-23 NOTE — TELEPHONE ENCOUNTER
----- Message from Anali Jackson sent at 6/23/2022  4:30 PM CDT -----  Contact: Patient  Patient need a 90 day supply of the below RX called in    Patient does not have a ride all the time so he need a 90 day supply and more than 2 refills. Please call into pharmacy ASAP    Patient cannot go to [facility] every 2 weeks he does not have a ride.  Call in to the below Pharmacy      sildenafiL (VIAGRA) 100 MG tablet            [facility]  [address] [address]        Call back #  for patient  [phone]

## 2022-06-23 NOTE — TELEPHONE ENCOUNTER
Pt wants #30 tablets a month of 100mg and wants it as 90day. I advised it should not be taken daily and with tri mix and asked about ov notes having oral meds  not working. Pt states he can not afford gas to go to pharmacy so much and he has always been on daily viagra.. I told pt I would send message to provider.

## 2022-06-23 NOTE — ASSESSMENT & PLAN NOTE
Patient reports right inguinal hernia repair done in 2015.  Patient presents with complaints pain and burning to right inguinal area.  CT abdomen performed in ER shows postsurgical changes of right inguinal hernia repair.  Referral sent to General surgery.

## 2022-06-29 RX ORDER — SILDENAFIL 100 MG/1
TABLET, FILM COATED ORAL
Qty: 90 TABLET | Refills: 3 | Status: SHIPPED | OUTPATIENT
Start: 2022-06-29 | End: 2022-08-17

## 2022-06-29 NOTE — TELEPHONE ENCOUNTER
New script sent to pharmacy on record.    Patient should not take more than 100 mg in 24 hour period.

## 2022-07-23 ENCOUNTER — HOSPITAL ENCOUNTER (EMERGENCY)
Facility: HOSPITAL | Age: 47
Discharge: HOME | End: 2022-07-23
Attending: EMERGENCY MEDICINE
Payer: MEDICAID

## 2022-07-23 VITALS
TEMPERATURE: 98 F | DIASTOLIC BLOOD PRESSURE: 88 MMHG | WEIGHT: 232 LBS | HEART RATE: 99 BPM | HEIGHT: 71 IN | OXYGEN SATURATION: 98 % | SYSTOLIC BLOOD PRESSURE: 133 MMHG | BODY MASS INDEX: 32.48 KG/M2 | RESPIRATION RATE: 18 BRPM

## 2022-07-23 DIAGNOSIS — K40.90 RIGHT INGUINAL HERNIA: Primary | ICD-10-CM

## 2022-07-23 PROCEDURE — 63600175 PHARM REV CODE 636 W HCPCS: Performed by: PHYSICIAN ASSISTANT

## 2022-07-23 PROCEDURE — 96372 THER/PROPH/DIAG INJ SC/IM: CPT | Performed by: PHYSICIAN ASSISTANT

## 2022-07-23 PROCEDURE — 99284 EMERGENCY DEPT VISIT MOD MDM: CPT

## 2022-07-23 RX ORDER — HYDROCODONE BITARTRATE AND ACETAMINOPHEN 7.5; 325 MG/1; MG/1
1 TABLET ORAL EVERY 6 HOURS PRN
Qty: 18 TABLET | Refills: 0 | OUTPATIENT
Start: 2022-07-23 | End: 2023-01-19

## 2022-07-23 RX ORDER — KETOROLAC TROMETHAMINE 30 MG/ML
60 INJECTION, SOLUTION INTRAMUSCULAR; INTRAVENOUS
Status: COMPLETED | OUTPATIENT
Start: 2022-07-23 | End: 2022-07-23

## 2022-07-23 RX ADMIN — KETOROLAC TROMETHAMINE 60 MG: 30 INJECTION, SOLUTION INTRAMUSCULAR; INTRAVENOUS at 01:07

## 2022-07-23 NOTE — ED PROVIDER NOTES
Encounter Date: 7/23/2022       History     Chief Complaint   Patient presents with    Groin Pain     Pt to er c/o pain to right groin area onset yesterday. Hx of hernia mesh repair 2015.      pain to rlq/groin for pain off and on for last 3 years/ worsening  pain/ same as when he had hernia but no bulge. He was seeing by Urology twice last week and he was prescribed and antiinflammatory but it is not helping him. He is scheduled to follow up with a surgeon August 2nd to have the right inguinal hernia repair.              Abdominal Pain  The current episode started several weeks ago. The problem has been gradually worsening. The abdominal pain is located in the groin. The severity of the abdominal pain is 5/10. The abdominal pain is relieved by certain positions. The abdominal pain is exacerbated by coughing, certain positions, belching and movement. The other symptoms of the illness do not include fever, nausea, vomiting or dysuria.   The patient has not had a change in bowel habit.     Review of patient's allergies indicates:  No Known Allergies  Past Medical History:   Diagnosis Date    ED (erectile dysfunction)      Past Surgical History:   Procedure Laterality Date    HERNIA REPAIR       Family History   Problem Relation Age of Onset    No Known Problems Father     No Known Problems Mother      Social History     Tobacco Use    Smoking status: Current Every Day Smoker     Types: Vaping with nicotine    Smokeless tobacco: Never Used    Tobacco comment: refused   Substance Use Topics    Alcohol use: Never    Drug use: Not Currently     Types: Marijuana     Review of Systems   Constitutional: Negative for fever.   Gastrointestinal: Positive for abdominal pain. Negative for nausea and vomiting.   Genitourinary: Positive for testicular pain. Negative for dysuria.       Physical Exam     Initial Vitals [07/23/22 1250]   BP Pulse Resp Temp SpO2   133/88 99 18 97.9 °F (36.6 °C) 98 %      MAP       --          Physical Exam    Nursing note and vitals reviewed.  Constitutional: He appears well-developed.   Cardiovascular: Normal rate and regular rhythm.   Pulmonary/Chest: Breath sounds normal.   Abdominal: Abdomen is soft. Bowel sounds are normal. A hernia is present. Hernia confirmed positive in the right inguinal area.   Positive for tenderness over right inguinal canal, no bulge on physical exam . Mo sign of swelling or redness.    Genitourinary:    Penis normal.       Neurological: He is alert and oriented to person, place, and time.   Skin: Skin is warm and dry.   Psychiatric: He has a normal mood and affect. Thought content normal.         ED Course   Procedures  Labs Reviewed - No data to display       Imaging Results    None       X-Rays:   Independently Interpreted Readings:   Other Readings:  Ct scan from 6/14/2022   Impression:     1. Small to moderate fat containing umbilical hernia  2. Postsurgical changes of right inguinal hernia repair.  3. Trace fat at the medial right inguinal canal is nonspecific.  This appears to been have described on the prior CT report as mild fatty protrusion at the inguinal region.  4. Nonspecific prostatic calcifications  5. Left lower lobe pulmonary nodule..  See Fleischner recommendations below:  Fleischner criteria for nodules 6-8 mm:       Medications   ketorolac injection 60 mg (60 mg Intramuscular Given 7/23/22 1346)     Medical Decision Making:   Differential Diagnosis:   Right inguinal hernia                      Clinical Impression:   Final diagnoses:  [K40.90] Right inguinal hernia (Primary)          ED Disposition Condition    Discharge Stable        ED Prescriptions     Medication Sig Dispense Start Date End Date Auth. Provider    HYDROcodone-acetaminophen (NORCO) 7.5-325 mg per tablet Take 1 tablet by mouth every 6 (six) hours as needed for Pain. 18 tablet 7/23/2022  MARISELA Stiles        Follow-up Information     Follow up With Specialties Details Why Contact  Info    [facility] - Emergency Dept Emergency Medicine  If symptoms worsen [address]  [facility] [id number] [phone]           MARISELA Stiles  07/23/22 2974

## 2022-08-02 ENCOUNTER — OFFICE VISIT (OUTPATIENT)
Dept: SURGERY | Facility: CLINIC | Age: 47
End: 2022-08-02
Payer: MEDICAID

## 2022-08-02 VITALS
TEMPERATURE: 98 F | HEIGHT: 71 IN | DIASTOLIC BLOOD PRESSURE: 83 MMHG | RESPIRATION RATE: 18 BRPM | HEART RATE: 93 BPM | WEIGHT: 242 LBS | BODY MASS INDEX: 33.88 KG/M2 | OXYGEN SATURATION: 99 % | SYSTOLIC BLOOD PRESSURE: 118 MMHG

## 2022-08-02 DIAGNOSIS — K40.90 RIGHT INGUINAL HERNIA: ICD-10-CM

## 2022-08-02 PROCEDURE — 99214 OFFICE O/P EST MOD 30 MIN: CPT | Mod: PBBFAC

## 2022-08-02 RX ORDER — SULFAMETHOXAZOLE AND TRIMETHOPRIM 800; 160 MG/1; MG/1
1 TABLET ORAL DAILY
Qty: 5 TABLET | Refills: 0 | Status: SHIPPED | OUTPATIENT
Start: 2022-08-02 | End: 2022-08-07

## 2022-08-02 RX ORDER — GABAPENTIN 300 MG/1
300 CAPSULE ORAL 3 TIMES DAILY
Qty: 90 CAPSULE | Refills: 11 | OUTPATIENT
Start: 2022-08-02 | End: 2022-11-29

## 2022-08-02 NOTE — PROGRESS NOTES
"Chief complaint: R inguinal hernia pain     HPI:   Erik Patel is a 47 y.o. male with PMHx significant for prior RIH (2015) & LIH repair (2011) both wish mesh presenting today for evaluation of R inguinal hernia. Pt reports 10/10 "stabbing and burning" pain at the previous R hernia incision site which radiates to his thigh. Pt reports some urinary hesitancy, change in BM alternating diarrhea and constipation, and nausea without vomiting. Denies relief with prior prescribed medications or Tylenol. No prior abdominal sx.     ROS:  Negative except for as stated above       PMHx:  has a past medical history of ED (erectile dysfunction).  PSHx:  has a past surgical history that includes Hernia repair. Bilateral with Mesh. Left in 2012 and Right in 2015.  FamHx: family history includes No Known Problems in his father and mother.  SocHx:  reports that he has been smoking vaping with nicotine. He has never used smokeless tobacco. He reports previous drug use. Drug: Marijuana. He reports that he does not drink alcohol.    Physical Exam:  Vitals: There were no vitals taken for this visit.  General: awake, alert, cooperative, in no acute distress. Pt oriented x3.  Mood/affect normal.   Chest: RRR.  Lungs: No increased WOB; no retractions or nasal flaring  Abd: soft, non-tender, non-distended,   Extremities:  MAEW, good muscle tone and strength  Skin: no rash or edema noted  Neuro: AAO x 3, follows commands, normal gait  Groin: Right groin tender & sensitive to touch; pt in a lot of distress, jumped upon palpation, difficult to assess if hernia actually present. No evidence of infection. Left groin non-tender with no evidence of infection or recurrence.     Imaging:   CT abb/pel- 6/14 1. Small to moderate fat containing umbilical hernia  2. Postsurgical changes of right inguinal hernia repair.  3. Trace fat at the medial right inguinal canal is nonspecific.  This appears to been have described on the prior CT report as " mild fatty protrusion at the inguinal region.    Assessment and Plan:  Mr. Erik Douglass is a 46 y/o M with a PMHx of bilateral inguinal hernia repair with mesh presenting with right inguinal tenderness and neuropathic pain    - Recommended laparoscopic hernia repair; referral to [facility]   - Prescribed gabapentin to relieve neuropathic pain  - Prescribed Bactrim for UTI      Lisa Peoples MS3   [facility] Hospital[facility]-NO   And  Pete Gutierrez MD  PGY 5 [employer] General Surgery  [phone]

## 2022-09-04 ENCOUNTER — HOSPITAL ENCOUNTER (EMERGENCY)
Facility: HOSPITAL | Age: 47
Discharge: HOME | End: 2022-09-04
Attending: EMERGENCY MEDICINE
Payer: MEDICAID

## 2022-09-04 VITALS
WEIGHT: 232 LBS | DIASTOLIC BLOOD PRESSURE: 67 MMHG | HEART RATE: 69 BPM | RESPIRATION RATE: 14 BRPM | SYSTOLIC BLOOD PRESSURE: 105 MMHG | HEIGHT: 71 IN | BODY MASS INDEX: 32.48 KG/M2 | TEMPERATURE: 98 F | OXYGEN SATURATION: 100 %

## 2022-09-04 DIAGNOSIS — R10.31 RIGHT GROIN PAIN: Primary | ICD-10-CM

## 2022-09-04 DIAGNOSIS — Z98.890 S/P RIGHT INGUINAL HERNIA REPAIR: ICD-10-CM

## 2022-09-04 DIAGNOSIS — Z87.19 S/P RIGHT INGUINAL HERNIA REPAIR: ICD-10-CM

## 2022-09-04 LAB
ALBUMIN SERPL-MCNC: 4.3 GM/DL (ref 3.5–5)
ALBUMIN/GLOB SERPL: 1.3 RATIO (ref 1.1–2)
ALP SERPL-CCNC: 74 UNIT/L (ref 40–150)
ALT SERPL-CCNC: 24 UNIT/L (ref 0–55)
APPEARANCE UR: CLEAR
AST SERPL-CCNC: 19 UNIT/L (ref 5–34)
BACTERIA #/AREA URNS AUTO: NORMAL /HPF
BASOPHILS # BLD AUTO: 0.05 X10(3)/MCL (ref 0–0.2)
BASOPHILS NFR BLD AUTO: 0.7 %
BILIRUB UR QL STRIP.AUTO: NEGATIVE MG/DL
BILIRUBIN DIRECT+TOT PNL SERPL-MCNC: 0.6 MG/DL
BUN SERPL-MCNC: 11.5 MG/DL (ref 8.9–20.6)
CALCIUM SERPL-MCNC: 10.1 MG/DL (ref 8.4–10.2)
CHLORIDE SERPL-SCNC: 106 MMOL/L (ref 98–107)
CO2 SERPL-SCNC: 29 MMOL/L (ref 22–29)
COLOR UR AUTO: YELLOW
CREAT SERPL-MCNC: 0.93 MG/DL (ref 0.73–1.18)
EOSINOPHIL # BLD AUTO: 0.19 X10(3)/MCL (ref 0–0.9)
EOSINOPHIL NFR BLD AUTO: 2.5 %
ERYTHROCYTE [DISTWIDTH] IN BLOOD BY AUTOMATED COUNT: 12.5 % (ref 11.5–17)
GFR SERPLBLD CREATININE-BSD FMLA CKD-EPI: >60 MLS/MIN/1.73/M2
GLOBULIN SER-MCNC: 3.2 GM/DL (ref 2.4–3.5)
GLUCOSE SERPL-MCNC: 68 MG/DL (ref 74–100)
GLUCOSE UR QL STRIP.AUTO: NEGATIVE MG/DL
HCT VFR BLD AUTO: 42.5 % (ref 42–52)
HGB BLD-MCNC: 14.7 GM/DL (ref 14–18)
IMM GRANULOCYTES # BLD AUTO: 0.04 X10(3)/MCL (ref 0–0.04)
IMM GRANULOCYTES NFR BLD AUTO: 0.5 %
KETONES UR QL STRIP.AUTO: NEGATIVE MG/DL
LEUKOCYTE ESTERASE UR QL STRIP.AUTO: NEGATIVE UNIT/L
LIPASE SERPL-CCNC: 21 U/L
LYMPHOCYTES # BLD AUTO: 1.8 X10(3)/MCL (ref 0.6–4.6)
LYMPHOCYTES NFR BLD AUTO: 23.8 %
MCH RBC QN AUTO: 29.2 PG (ref 27–31)
MCHC RBC AUTO-ENTMCNC: 34.6 MG/DL (ref 33–36)
MCV RBC AUTO: 84.5 FL (ref 80–94)
MONOCYTES # BLD AUTO: 0.8 X10(3)/MCL (ref 0.1–1.3)
MONOCYTES NFR BLD AUTO: 10.6 %
NEUTROPHILS # BLD AUTO: 4.7 X10(3)/MCL (ref 2.1–9.2)
NEUTROPHILS NFR BLD AUTO: 61.9 %
NITRITE UR QL STRIP.AUTO: NEGATIVE
NRBC BLD AUTO-RTO: 0 %
PH UR STRIP.AUTO: 6.5 [PH]
PLATELET # BLD AUTO: 264 X10(3)/MCL (ref 130–400)
PMV BLD AUTO: 9.4 FL (ref 7.4–10.4)
POTASSIUM SERPL-SCNC: 4.5 MMOL/L (ref 3.5–5.1)
PROT SERPL-MCNC: 7.5 GM/DL (ref 6.4–8.3)
PROT UR QL STRIP.AUTO: NEGATIVE MG/DL
RBC # BLD AUTO: 5.03 X10(6)/MCL (ref 4.7–6.1)
RBC #/AREA URNS AUTO: <5 /HPF
RBC UR QL AUTO: NEGATIVE UNIT/L
SODIUM SERPL-SCNC: 141 MMOL/L (ref 136–145)
SP GR UR STRIP.AUTO: 1.02 (ref 1–1.03)
SQUAMOUS #/AREA URNS AUTO: <5 /HPF
UROBILINOGEN UR STRIP-ACNC: 1 MG/DL
WBC # SPEC AUTO: 7.6 X10(3)/MCL (ref 4.5–11.5)
WBC #/AREA URNS AUTO: <5 /HPF

## 2022-09-04 PROCEDURE — 63600175 PHARM REV CODE 636 W HCPCS: Performed by: EMERGENCY MEDICINE

## 2022-09-04 PROCEDURE — 81001 URINALYSIS AUTO W/SCOPE: CPT | Performed by: PHYSICIAN ASSISTANT

## 2022-09-04 PROCEDURE — 85025 COMPLETE CBC W/AUTO DIFF WBC: CPT | Performed by: PHYSICIAN ASSISTANT

## 2022-09-04 PROCEDURE — 96375 TX/PRO/DX INJ NEW DRUG ADDON: CPT

## 2022-09-04 PROCEDURE — 96374 THER/PROPH/DIAG INJ IV PUSH: CPT

## 2022-09-04 PROCEDURE — 99284 EMERGENCY DEPT VISIT MOD MDM: CPT | Mod: 25

## 2022-09-04 PROCEDURE — 83690 ASSAY OF LIPASE: CPT | Performed by: PHYSICIAN ASSISTANT

## 2022-09-04 PROCEDURE — 36415 COLL VENOUS BLD VENIPUNCTURE: CPT | Performed by: PHYSICIAN ASSISTANT

## 2022-09-04 PROCEDURE — 80053 COMPREHEN METABOLIC PANEL: CPT | Performed by: PHYSICIAN ASSISTANT

## 2022-09-04 RX ORDER — METHOCARBAMOL 100 MG/ML
1000 INJECTION, SOLUTION INTRAMUSCULAR; INTRAVENOUS ONCE
Status: COMPLETED | OUTPATIENT
Start: 2022-09-04 | End: 2022-09-04

## 2022-09-04 RX ORDER — KETOROLAC TROMETHAMINE 10 MG/1
10 TABLET, FILM COATED ORAL EVERY 6 HOURS PRN
Qty: 20 TABLET | Refills: 0 | Status: SHIPPED | OUTPATIENT
Start: 2022-09-04 | End: 2022-09-09

## 2022-09-04 RX ORDER — METHOCARBAMOL 500 MG/1
1000 TABLET, FILM COATED ORAL 3 TIMES DAILY
Qty: 30 TABLET | Refills: 0 | Status: SHIPPED | OUTPATIENT
Start: 2022-09-04 | End: 2022-09-09

## 2022-09-04 RX ORDER — KETOROLAC TROMETHAMINE 30 MG/ML
30 INJECTION, SOLUTION INTRAMUSCULAR; INTRAVENOUS
Status: COMPLETED | OUTPATIENT
Start: 2022-09-04 | End: 2022-09-04

## 2022-09-04 RX ADMIN — METHOCARBAMOL 1000 MG: 100 INJECTION, SOLUTION INTRAMUSCULAR; INTRAVENOUS at 02:09

## 2022-09-04 RX ADMIN — KETOROLAC TROMETHAMINE 30 MG: 30 INJECTION, SOLUTION INTRAMUSCULAR at 12:09

## 2022-09-04 NOTE — DISCHARGE INSTRUCTIONS
Clinic Address Phone # Insurance   [facility] [address] [place] [place] [address] [phone] Medicare Part B; private insurance; self-pay  (No Medicaid)    [facility]  [address], [phone] Self-pay only  (Not taking new chronic pain patients during COVID)   [facility] [address]  [place], [phone] Medicare as primary; Medicaid only if secondary; Private insurance; self-pay   [facility] [address] [facility] [phone] Medicaid, Medicare, most commercial insurances and self pay   [facility]   [address]  [place] [address] [phone] Medicare; private insurance; self-pay  (No Medicaid)    [facility]   [address]  [place], [place] [address] [phone] Medicare; private insurance   [facility]  [address]  [place], [place] [address] [phone] Medicare as primary; Medicaid only if secondary   Eliel Lowe MD [address] [facility], [phone] Medicare (Not all advantage programs); Private insurance; self-pay  (No Wellcare; Medicaid; People's Choice)    Ksenia Hamilton MD [address], [place] [id number] [phone] Medicare; private insurance  (No self-pay)    [facility] [address]  [place], [phone] Medicare Community Health Plan (Only); Medicaid (except Healthy Blue and Amerihealth)   (No Aetna)    [facility] [address]  [place], [phone] Medicare (except Healthy Blue); Medicaid (only if secondary)  (No self-pay)   Kolton Maurice MD [address] [facility] [place] [id number] [phone] Medicare; private insurance  (No self-pay)   Kervin Pedro MD [address] [facility] [address]  [place], [phone] Medicaid PPOs; Medicare PPOs   Mirella Russo  [address]  [place] [address] [phone] Self-pay only  (No Medicaid; Medicare; Private insurance)

## 2022-09-04 NOTE — ED PROVIDER NOTES
"Encounter Date: 9/4/2022       History     Chief Complaint   Patient presents with    Groin Pain     Stabbing burning sensation to R. Groin x months, states worse today, denies dysuria, denies any injury, Hx. Of Hernias "that's what it feels like" was seen for pain and given gabapentin in past      47-year-old male presenting with pain to his right groin.  He states that he had a inguinal hernia repair in [place] in [facility] in 2015 and he has been dealing with pain for the past 4 years.  He states that it feels like his body is rejecting the mesh.  For the past few months the pain has intensified and is now constant.  Pain is a stabbing burning sensation to the right groin that starts his incision and moves to his upper thigh and top of scrotum. Pain worsens with walking.  States that he has been taking Tylenol and gabapentin without relief.  He was seen at [facility] clinic for this and referred to a hernia specialist in [place].  He saw them last month but states he needs medical care hears he is unable to get transportation to [place] regularly.  Reports nausea at times but none now.  Denies fever, dysuria, penile discharge, vomiting or diarrhea.    The history is provided by the patient.   Review of patient's allergies indicates:  No Known Allergies  Past Medical History:   Diagnosis Date    ED (erectile dysfunction)      Past Surgical History:   Procedure Laterality Date    HERNIA REPAIR       Family History   Problem Relation Age of Onset    No Known Problems Father     No Known Problems Mother      Social History     Tobacco Use    Smoking status: Every Day     Types: Vaping with nicotine    Smokeless tobacco: Never    Tobacco comments:     refused   Substance Use Topics    Alcohol use: Never    Drug use: Not Currently     Types: Marijuana     Review of Systems   Constitutional:  Negative for appetite change, chills and fever.   HENT:  Negative for congestion and sore throat.    Eyes:  Negative for " pain and visual disturbance.   Respiratory:  Negative for cough and shortness of breath.    Cardiovascular:  Negative for chest pain and leg swelling.   Gastrointestinal:  Positive for nausea. Negative for diarrhea and vomiting.   Genitourinary:  Positive for testicular pain. Negative for difficulty urinating, dysuria, genital sores, hematuria and penile discharge.   Skin:  Negative for rash and wound.   Allergic/Immunologic: Negative for food allergies and immunocompromised state.   Neurological:  Negative for seizures, syncope and weakness.     Physical Exam     Initial Vitals [09/04/22 1156]   BP Pulse Resp Temp SpO2   (!) 145/80 87 18 97.9 °F (36.6 °C) 99 %      MAP       --         Physical Exam    Nursing note and vitals reviewed.  Constitutional: He appears well-developed and well-nourished. He is not diaphoretic. He does not appear ill. No distress.   HENT:   Head: Normocephalic and atraumatic.   Right Ear: External ear normal.   Left Ear: External ear normal.   Nose: Nose normal.   Mouth/Throat: Oropharynx is clear and moist.   Eyes: Conjunctivae and EOM are normal. Pupils are equal, round, and reactive to light.   Neck: Neck supple. No tracheal deviation present.   Cardiovascular:  Normal rate, regular rhythm, normal heart sounds and intact distal pulses.           No murmur heard.  Pulmonary/Chest: Breath sounds normal. No respiratory distress. He has no wheezes. He has no rhonchi. He has no rales.   Abdominal: Abdomen is soft. Bowel sounds are normal. He exhibits no distension. There is no abdominal tenderness.   No right CVA tenderness.  No left CVA tenderness.   Genitourinary:    Testes and penis normal.   Circumcised.       Musculoskeletal:         General: No edema. Normal range of motion.      Cervical back: Neck supple.     Neurological: He is alert and oriented to person, place, and time. He has normal strength. No cranial nerve deficit or sensory deficit. GCS score is 15. GCS eye subscore is 4.  GCS verbal subscore is 5. GCS motor subscore is 6.   Skin: Skin is warm and dry. Capillary refill takes less than 2 seconds.   Psychiatric: He has a normal mood and affect. His mood appears not anxious.       ED Course   Procedures  Labs Reviewed   COMPREHENSIVE METABOLIC PANEL - Abnormal; Notable for the following components:       Result Value    Glucose Level 68 (*)     All other components within normal limits   LIPASE - Normal   URINALYSIS, REFLEX TO URINE CULTURE - Normal   URINALYSIS, MICROSCOPIC - Normal   CBC W/ AUTO DIFFERENTIAL    Narrative:     The following orders were created for panel order CBC Auto Differential.  Procedure                               Abnormality         Status                     ---------                               -----------         ------                     CBC with Differential[[id number]]                            Final result                 Please view results for these tests on the individual orders.   CBC WITH DIFFERENTIAL          Imaging Results    None          Medications   ketorolac injection 30 mg (30 mg Intravenous Given 9/4/22 1230)   methocarbamoL injection 1,000 mg (1,000 mg Intravenous Given 9/4/22 1431)     Medical Decision Making:   History:   Old Records Summarized: records from clinic visits.       <> Summary of Records: 6/14/22- CT abdomen pelvis done that demonstrates a small amount of fat at the medial aspect of the right inguinal region  8/10/22- [facility] surgery clinic- may need laparoscopic right inguinal hernia pair if inguinal hernias present versus discussion of possible right inguinal neurectomy if no inguinal hernias present  Initial Assessment:   46 yo male with right groin pain for several months, seeing hernia specialist in [place]. Unable to get transportation there regularly   Clinical Tests:   Lab Tests: Ordered and Reviewed  The following lab test(s) were unremarkable: CBC, CMP, Lipase and Urinalysis  ED Management:  Pain  improved with Toradol and Robaxin IV  Recommended patient call [facility] surgery clinic and [facility] surgery clinic for follow up  Pain management resources given for alternative pain therapies            ED Course as of 09/04/22 1505   Sun Sep 04, 2022   1328 Pain improved to 5/10 [KM]   1444 Pain improved, will send home with Robaxin and Toradol. Continue gabapentin. Call [facility] for follow up  [KM]      ED Course User Index  [KM] Maria Luisa Spence MD               Clinical Impression:   Final diagnoses:  [R10.31] Right groin pain (Primary)  [Z98.890, Z87.19] S/P right inguinal hernia repair      ED Disposition Condition    Discharge Stable          ED Prescriptions       Medication Sig Dispense Start Date End Date Auth. Provider    ketorolac (TORADOL) 10 mg tablet Take 1 tablet (10 mg total) by mouth every 6 (six) hours as needed for Pain. Do not take any other NSAIDs 20 tablet 9/4/2022 9/9/2022 Maria Luisa Spence MD    methocarbamoL (ROBAXIN) 500 MG Tab Take 2 tablets (1,000 mg total) by mouth 3 (three) times daily. for 5 days 30 tablet 9/4/2022 9/9/2022 Maria Luisa Spence MD          Follow-up Information       Follow up With Specialties Details Why Contact Info    MONIE Vásquez Nurse Practitioner Schedule an appointment as soon as possible for a visit   [address] [address]  [phone]      [facility] - Emergency Dept Emergency Medicine  As needed, If symptoms worsen [address] [id number] [phone]    [facility]  Call   [address] [address] [phone]               Maria Luisa Spence MD  09/04/22 3265

## 2022-09-20 ENCOUNTER — TELEPHONE (OUTPATIENT)
Dept: SURGERY | Facility: CLINIC | Age: 47
End: 2022-09-20
Payer: MEDICAID

## 2022-09-20 NOTE — TELEPHONE ENCOUNTER
----- Message from Alphonse Biggs sent at 9/20/2022  9:57 AM CDT -----  Regarding: RE: Gen Surg  Spoke w/ pt, pt understands and will attend scheduled postop in . Thanks!  ----- Message -----  From: Pete Byrne LPN  Sent: 9/20/2022   8:33 AM CDT  To: Alphonse Biggs  Subject: RE: Gen Surg                                     Spoke with Dr. Walter and Dr. Pelaez. Per both of our providers, patient must have postop appointment in [place] with the providers that performed the surgery.  ----- Message -----  From: Alphonse Biggs  Sent: 9/16/2022   1:48 PM CDT  To: Pete Byrne LPN  Subject: Gen Surg                                         Pt called requesting to have first post-op here at [facility]. Sx was done today in  and was told by Mandi they could f/u here. Please discuss with surgeons and let us know if pt can/cannot be scheduled for postop here at [facility].Thanks!

## 2022-09-26 PROBLEM — Z00.00 WELLNESS EXAMINATION: Status: RESOLVED | Noted: 2022-06-23 | Resolved: 2022-09-26

## 2022-10-04 ENCOUNTER — TELEPHONE (OUTPATIENT)
Dept: FAMILY MEDICINE | Facility: CLINIC | Age: 47
End: 2022-10-04
Payer: MEDICAID

## 2022-10-04 NOTE — TELEPHONE ENCOUNTER
"----- Message from Nichole Burr sent at 10/4/2022  9:20 AM CDT -----  Regarding: Med Advice  Type:  Needs Medical Advice    Who Called: pt  Symptoms (please be specific): burning when urinating   How long has patient had these symptoms:  2 days  Pharmacy name and phone #:  [facility], [place]  Would the patient rather a call back or a response via [facility]?   Best Call Back Number: [phone]   Additional Information: pt had surgery 2 weeks ago where he says " a line to his scrotum" was cut .. now he is having uti symptoms .. he was requesting penicillin but I advised him he would most likely need a culture to go into the urgent care and he couldn't make the availability today for 10 he needed something after 3       "

## 2022-11-29 ENCOUNTER — HOSPITAL ENCOUNTER (EMERGENCY)
Facility: HOSPITAL | Age: 47
Discharge: HOME | End: 2022-11-29
Attending: EMERGENCY MEDICINE
Payer: MEDICAID

## 2022-11-29 VITALS
HEART RATE: 81 BPM | WEIGHT: 242 LBS | DIASTOLIC BLOOD PRESSURE: 80 MMHG | BODY MASS INDEX: 33.88 KG/M2 | HEIGHT: 71 IN | SYSTOLIC BLOOD PRESSURE: 121 MMHG | RESPIRATION RATE: 18 BRPM | TEMPERATURE: 98 F | OXYGEN SATURATION: 99 %

## 2022-11-29 DIAGNOSIS — R10.31 RIGHT GROIN PAIN: Primary | ICD-10-CM

## 2022-11-29 DIAGNOSIS — K40.90 RIGHT INGUINAL HERNIA: ICD-10-CM

## 2022-11-29 PROCEDURE — 99284 EMERGENCY DEPT VISIT MOD MDM: CPT | Mod: 25

## 2022-11-29 RX ORDER — KETOROLAC TROMETHAMINE 10 MG/1
10 TABLET, FILM COATED ORAL EVERY 6 HOURS
Qty: 20 TABLET | Refills: 0 | Status: SHIPPED | OUTPATIENT
Start: 2022-11-29 | End: 2022-12-04

## 2022-11-29 RX ORDER — CYCLOBENZAPRINE HCL 10 MG
10 TABLET ORAL 3 TIMES DAILY PRN
Qty: 15 TABLET | Refills: 0 | Status: SHIPPED | OUTPATIENT
Start: 2022-11-29 | End: 2022-12-04

## 2022-11-29 RX ORDER — GABAPENTIN 300 MG/1
300 CAPSULE ORAL 3 TIMES DAILY
Qty: 90 CAPSULE | Refills: 11 | Status: SHIPPED | OUTPATIENT
Start: 2022-11-29 | End: 2023-02-07

## 2022-11-29 NOTE — ED PROVIDER NOTES
Encounter Date: 11/29/2022       History     Chief Complaint   Patient presents with    Groin Pain     Right groin pain that started about a week ago with increased redness and swelling, pt reports right inguinal hernia reapir revision with mesh removal and replacement on 9/16/2022 with Dr Pete Gutierrez in [place]     The history is provided by the patient. No  was used.   Groin Pain  This is a chronic problem. The current episode started more than 1 week ago. The problem occurs hourly. The problem has not changed since onset.Pertinent negatives include no chest pain and no shortness of breath. Nothing aggravates the symptoms. Nothing relieves the symptoms.   Pt has been having intermittent pain since [facility] revision surgery in September, but feels it has worsened over the past 2 weeks.  Taking ibuprofen without relief.    Review of patient's allergies indicates:  No Known Allergies  Past Medical History:   Diagnosis Date    ED (erectile dysfunction)      Past Surgical History:   Procedure Laterality Date    HERNIA REPAIR       Family History   Problem Relation Age of Onset    No Known Problems Father     No Known Problems Mother      Social History     Tobacco Use    Smoking status: Every Day     Types: Vaping with nicotine    Smokeless tobacco: Never    Tobacco comments:     refused   Substance Use Topics    Alcohol use: Never    Drug use: Not Currently     Types: Marijuana     Review of Systems   Constitutional:  Negative for fever.   HENT:  Negative for sore throat.    Respiratory:  Negative for shortness of breath.    Cardiovascular:  Negative for chest pain.   Gastrointestinal:  Negative for nausea.   Genitourinary:  Negative for dysuria.   Musculoskeletal:  Negative for back pain.   Skin:  Negative for rash.   Neurological:  Negative for weakness.   Hematological:  Does not bruise/bleed easily.     Physical Exam     Initial Vitals [11/29/22 0929]   BP Pulse Resp Temp SpO2   121/80 81  18 97.9 °F (36.6 °C) 99 %      MAP       --         Physical Exam    Nursing note and vitals reviewed.  Constitutional: He appears well-developed and well-nourished.   HENT:   Head: Normocephalic and atraumatic.   Right Ear: External ear normal.   Left Ear: External ear normal.   Nose: Nose normal.   Eyes: Conjunctivae and EOM are normal. Pupils are equal, round, and reactive to light.   Neck: Neck supple.   Normal range of motion.  Cardiovascular:  Normal rate, regular rhythm, normal heart sounds and intact distal pulses.           Pulmonary/Chest: Breath sounds normal.   Abdominal: Abdomen is soft. Bowel sounds are normal.   Musculoskeletal:         General: Normal range of motion.      Cervical back: Normal range of motion and neck supple.     Neurological: He is alert and oriented to person, place, and time. He has normal strength. GCS score is 15. GCS eye subscore is 4. GCS verbal subscore is 5. GCS motor subscore is 6.   Skin: Skin is warm and dry. Capillary refill takes less than 2 seconds.   Psychiatric: He has a normal mood and affect. His behavior is normal. Judgment and thought content normal.       ED Course   Procedures  Labs Reviewed - No data to display       Imaging Results    None          Medications - No data to display                           Clinical Impression:   Final diagnoses:  [R10.31] Right groin pain (Primary)  [K40.90] Right inguinal hernia      ED Disposition Condition    Discharge Stable          ED Prescriptions       Medication Sig Dispense Start Date End Date Auth. Provider    gabapentin (NEURONTIN) 300 MG capsule Take 1 capsule (300 mg total) by mouth 3 (three) times daily. 90 capsule 11/29/2022 11/29/2023 Pete Armijo MD    cyclobenzaprine (FLEXERIL) 10 MG tablet Take 1 tablet (10 mg total) by mouth 3 (three) times daily as needed for Muscle spasms. 15 tablet 11/29/2022 12/4/2022 Pete Armijo MD    ketorolac (TORADOL) 10 mg tablet Take 1 tablet (10 mg  total) by mouth every 6 (six) hours. for 5 days 20 tablet 11/29/2022 12/4/2022 Pete Armijo MD          Follow-up Information    None          Pete Armijo MD  11/29/22 5389

## 2022-12-13 ENCOUNTER — OFFICE VISIT (OUTPATIENT)
Dept: SURGERY | Facility: CLINIC | Age: 47
End: 2022-12-13
Payer: MEDICAID

## 2022-12-13 VITALS
TEMPERATURE: 99 F | HEART RATE: 107 BPM | SYSTOLIC BLOOD PRESSURE: 136 MMHG | OXYGEN SATURATION: 98 % | DIASTOLIC BLOOD PRESSURE: 67 MMHG | HEIGHT: 71 IN | WEIGHT: 241.81 LBS | BODY MASS INDEX: 33.85 KG/M2 | RESPIRATION RATE: 20 BRPM

## 2022-12-13 DIAGNOSIS — R10.30 INGUINAL PAIN, UNSPECIFIED LATERALITY: Primary | ICD-10-CM

## 2022-12-13 DIAGNOSIS — F17.200 NEEDS SMOKING CESSATION EDUCATION: ICD-10-CM

## 2022-12-13 PROCEDURE — 99214 OFFICE O/P EST MOD 30 MIN: CPT | Mod: PBBFAC

## 2022-12-13 RX ORDER — METHOCARBAMOL 500 MG/1
500 TABLET, FILM COATED ORAL 4 TIMES DAILY
Qty: 20 TABLET | Refills: 0 | Status: SHIPPED | OUTPATIENT
Start: 2022-12-13 | End: 2022-12-23

## 2022-12-13 NOTE — PROGRESS NOTES
[facility] General Surgery Clinic Note    HPI: Erik Patel is a 48 yo M s/p Right recurrent inguinal hernia repair with mesh explantation on 9/16/2022. Patient c/o of intermittent stabbing, burning pain on his R groin that radiates down his R thigh. He reports having same pain since his initial hernia repair surgery in 2015, and hernia repair on 9/16 did not improve his pain. Patient requests pain medication that would not put him to sleep. He reports he is unable to work due to pain. He also c/o sore throat, fatigue, and fever that started yesterday. He denies signs of skin infection at the incision site, chills, nausea, vomiting, abdominal pain, back pain.        PMH:   Past Medical History:   Diagnosis Date    ED (erectile dysfunction)       Meds:   Current Outpatient Medications:     sildenafiL (VIAGRA) 100 MG tablet, TAKE 1 TABLET BY MOUTH ONCE DAILY AS NEEDED PRIOR  TO  ACTIVITY, Disp: 30 tablet, Rfl: 9    gabapentin (NEURONTIN) 300 MG capsule, Take 1 capsule (300 mg total) by mouth 3 (three) times daily., Disp: 90 capsule, Rfl: 11    HYDROcodone-acetaminophen (NORCO) 7.5-325 mg per tablet, Take 1 tablet by mouth every 6 (six) hours as needed for Pain., Disp: 18 tablet, Rfl: 0  Allergies: Review of patient's allergies indicates:  No Known Allergies  Social History:   Social History     Tobacco Use    Smoking status: Every Day     Types: Vaping with nicotine    Smokeless tobacco: Never    Tobacco comments:     refused   Substance Use Topics    Alcohol use: Never    Drug use: Not Currently     Types: Marijuana     Family History:   Family History   Problem Relation Age of Onset    No Known Problems Father     No Known Problems Mother      Surgical History:   Past Surgical History:   Procedure Laterality Date    HERNIA REPAIR       Review of Systems:  Skin: No rashes or itching.  Head: headache since yesterday - positive for flu like symptoms  Eyes: Denies eye pain or double vision.  Neck: positive for throat pain  since yesterday  Respiratory: Denies shortness of breath or chest pain  Cardiac: Denies palpitations or swelling in hands/feet.  Gastrointestinal: Denies nausea, denies vomiting.   Urinary: Denies dysuria or hematuria.  Vascular: Denies claudication or leg swelling.  Neuro: Denies numbness, weakness on BLE. Positive for stabbing/burning pain on the R groin      Objective:    Vitals:  Vitals:    12/13/22 0952   BP: 136/67   Pulse: 107   Resp: 20   Temp: 98.6 °F (37 °C)          Physical Exam:  Gen: NAD  Neuro: awake, alert, answering questions appropriately  CV: RRR  Resp: non-labored breathing, KARISSA  Abd: soft, ND, NT. No protruding mass with cough.  Ext: moves all 4 spontaneously and purposefully  Skin: warm, well perfused        Assessment/Plan:    Erik Patel is a 48 yo M s/p Right recurrent inguinal hernia repair with mesh explantation on 9/16/2022. Patient c/o of intermittent stabbing, burning pain on his R groin that radiates down his R thigh    - continue gabapentin for neuropathic pain  - OTC pain meds PRN      Jaeyeon Kweon   [facility] MS-3  12/13/2022 10:33 AM     RESIDENT ATTESTATION:    Agree with above documentation of history and physical exam. Changes made to body of text. In summary: distant RIHR c/b postop pain and recurrence s/p mesh explant and RIHR 9/16/22 at [facility] also with reported triple neurectomy. Now w/ persistent burning pain reports life-limiting symptoms unable to work since surgery in Sept. Will refer back to [facility] for evaluation, repeat US if needed.      Giovanni Aranda MD, PGY3  [facility] General Surgery

## 2022-12-13 NOTE — PROGRESS NOTES
I have reviewed the notes, assessments, and/or procedures performed by the resident, I concur with her/his documentation of Erik Patel.     Emelina Pelaez MD

## 2023-01-19 ENCOUNTER — HOSPITAL ENCOUNTER (EMERGENCY)
Facility: HOSPITAL | Age: 48
Discharge: HOME | End: 2023-01-19
Attending: FAMILY MEDICINE
Payer: MEDICAID

## 2023-01-19 VITALS
DIASTOLIC BLOOD PRESSURE: 89 MMHG | WEIGHT: 247 LBS | RESPIRATION RATE: 20 BRPM | OXYGEN SATURATION: 100 % | TEMPERATURE: 98 F | HEIGHT: 71 IN | SYSTOLIC BLOOD PRESSURE: 132 MMHG | HEART RATE: 102 BPM | BODY MASS INDEX: 34.58 KG/M2

## 2023-01-19 DIAGNOSIS — K40.90 LEFT INGUINAL HERNIA: Primary | ICD-10-CM

## 2023-01-19 LAB
ANION GAP SERPL CALC-SCNC: 8 MEQ/L
BASOPHILS # BLD AUTO: 0.02 X10(3)/MCL (ref 0–0.2)
BASOPHILS NFR BLD AUTO: 0.2 %
BUN SERPL-MCNC: 13.1 MG/DL (ref 8.9–20.6)
CALCIUM SERPL-MCNC: 9.5 MG/DL (ref 8.4–10.2)
CHLORIDE SERPL-SCNC: 105 MMOL/L (ref 98–107)
CO2 SERPL-SCNC: 29 MMOL/L (ref 22–29)
CREAT SERPL-MCNC: 0.98 MG/DL (ref 0.73–1.18)
CREAT/UREA NIT SERPL: 13
EOSINOPHIL # BLD AUTO: 0.15 X10(3)/MCL (ref 0–0.9)
EOSINOPHIL NFR BLD AUTO: 1.7 %
ERYTHROCYTE [DISTWIDTH] IN BLOOD BY AUTOMATED COUNT: 12.6 % (ref 11.5–17)
GFR SERPLBLD CREATININE-BSD FMLA CKD-EPI: >60 MLS/MIN/1.73/M2
GLUCOSE SERPL-MCNC: 74 MG/DL (ref 74–100)
HCT VFR BLD AUTO: 41 % (ref 42–52)
HGB BLD-MCNC: 13.6 GM/DL (ref 14–18)
IMM GRANULOCYTES # BLD AUTO: 0.01 X10(3)/MCL (ref 0–0.04)
IMM GRANULOCYTES NFR BLD AUTO: 0.1 %
LYMPHOCYTES # BLD AUTO: 1.84 X10(3)/MCL (ref 0.6–4.6)
LYMPHOCYTES NFR BLD AUTO: 21.4 %
MCH RBC QN AUTO: 27.2 PG
MCHC RBC AUTO-ENTMCNC: 33.2 MG/DL (ref 33–36)
MCV RBC AUTO: 82 FL (ref 80–94)
MONOCYTES # BLD AUTO: 0.62 X10(3)/MCL (ref 0.1–1.3)
MONOCYTES NFR BLD AUTO: 7.2 %
NEUTROPHILS # BLD AUTO: 5.95 X10(3)/MCL (ref 2.1–9.2)
NEUTROPHILS NFR BLD AUTO: 69.4 %
PLATELET # BLD AUTO: 278 X10(3)/MCL (ref 130–400)
PMV BLD AUTO: 9 FL (ref 7.4–10.4)
POTASSIUM SERPL-SCNC: 4.1 MMOL/L (ref 3.5–5.1)
RBC # BLD AUTO: 5 X10(6)/MCL (ref 4.7–6.1)
SODIUM SERPL-SCNC: 142 MMOL/L (ref 136–145)
WBC # SPEC AUTO: 8.6 X10(3)/MCL (ref 4.5–11.5)

## 2023-01-19 PROCEDURE — 80048 BASIC METABOLIC PNL TOTAL CA: CPT | Performed by: NURSE PRACTITIONER

## 2023-01-19 PROCEDURE — 99285 EMERGENCY DEPT VISIT HI MDM: CPT | Mod: 25

## 2023-01-19 PROCEDURE — 85025 COMPLETE CBC W/AUTO DIFF WBC: CPT | Performed by: NURSE PRACTITIONER

## 2023-01-19 PROCEDURE — 25500020 PHARM REV CODE 255: Performed by: NURSE PRACTITIONER

## 2023-01-19 PROCEDURE — 96374 THER/PROPH/DIAG INJ IV PUSH: CPT

## 2023-01-19 PROCEDURE — 63600175 PHARM REV CODE 636 W HCPCS: Performed by: NURSE PRACTITIONER

## 2023-01-19 PROCEDURE — 96375 TX/PRO/DX INJ NEW DRUG ADDON: CPT

## 2023-01-19 RX ORDER — ONDANSETRON 2 MG/ML
4 INJECTION INTRAMUSCULAR; INTRAVENOUS
Status: COMPLETED | OUTPATIENT
Start: 2023-01-19 | End: 2023-01-19

## 2023-01-19 RX ORDER — MORPHINE SULFATE 4 MG/ML
4 INJECTION, SOLUTION INTRAMUSCULAR; INTRAVENOUS
Status: COMPLETED | OUTPATIENT
Start: 2023-01-19 | End: 2023-01-19

## 2023-01-19 RX ORDER — HYDROCODONE BITARTRATE AND ACETAMINOPHEN 7.5; 325 MG/1; MG/1
1 TABLET ORAL EVERY 8 HOURS PRN
Qty: 12 TABLET | Refills: 0 | Status: SHIPPED | OUTPATIENT
Start: 2023-01-19 | End: 2023-01-24

## 2023-01-19 RX ADMIN — MORPHINE SULFATE 4 MG: 4 INJECTION INTRAVENOUS at 05:01

## 2023-01-19 RX ADMIN — IOPAMIDOL 100 ML: 755 INJECTION, SOLUTION INTRAVENOUS at 07:01

## 2023-01-19 RX ADMIN — ONDANSETRON HYDROCHLORIDE 4 MG: 2 SOLUTION INTRAMUSCULAR; INTRAVENOUS at 05:01

## 2023-01-19 NOTE — Clinical Note
"Erik Tomjacy Patel was seen and treated in our emergency department on 1/19/2023.  He may return to work on 01/23/2023.       If you have any questions or concerns, please don't hesitate to call.      MONIE Quach"

## 2023-01-19 NOTE — ED PROVIDER NOTES
Encounter Date: 1/19/2023       History     Chief Complaint   Patient presents with    Hernia     PT C/O LEFT SIDED LOWER ABDOMINAL PAIN. PT HAS A HISTORY OF HERNIA AND THE PAIN FEELS LIKE PREVIOUS HERNIA ISSUES. PT HAD SURGERY ON HERNIA TO RIGHT ABDOMEN IN 9/22.      47 year old male presents to ER complaining of left inguinal pain for about a week becoming worse over past 2 days. He denies NVD or constipation. He has had hernia repair in the past and has mesh. He reports fever one day last week but not since.     The history is provided by the patient. No  was used.   Review of patient's allergies indicates:  No Known Allergies  Past Medical History:   Diagnosis Date    ED (erectile dysfunction)      Past Surgical History:   Procedure Laterality Date    HERNIA REPAIR       Family History   Problem Relation Age of Onset    No Known Problems Father     No Known Problems Mother      Social History     Tobacco Use    Smoking status: Every Day     Types: Vaping with nicotine    Smokeless tobacco: Never    Tobacco comments:     refused   Substance Use Topics    Alcohol use: Never    Drug use: Not Currently     Types: Marijuana     Review of Systems   Constitutional:  Positive for fever. Negative for chills and diaphoresis.   Respiratory:  Negative for shortness of breath.    Gastrointestinal:  Positive for abdominal pain. Negative for diarrhea, nausea and vomiting.   Genitourinary:  Negative for dysuria.   Neurological:  Negative for dizziness and light-headedness.   All other systems reviewed and are negative.    Physical Exam     Initial Vitals [01/19/23 1724]   BP Pulse Resp Temp SpO2   132/89 102 18 98 °F (36.7 °C) 100 %      MAP       --         Physical Exam    Constitutional: He appears well-developed and well-nourished.   HENT:   Head: Normocephalic.   Eyes: EOM are normal.   Neck: Neck supple.   Cardiovascular:  Normal rate.           Pulmonary/Chest: No respiratory distress.   Abdominal:  Abdomen is soft. Bowel sounds are normal. No hernia.   Musculoskeletal:      Cervical back: Neck supple.         ED Course   Procedures  Labs Reviewed   CBC WITH DIFFERENTIAL - Abnormal; Notable for the following components:       Result Value    Hgb 13.6 (*)     Hct 41.0 (*)     All other components within normal limits   CBC W/ AUTO DIFFERENTIAL    Narrative:     The following orders were created for panel order CBC auto differential.  Procedure                               Abnormality         Status                     ---------                               -----------         ------                     CBC with Differential[[id number]]        Abnormal            Final result                 Please view results for these tests on the individual orders.   BASIC METABOLIC PANEL          Imaging Results              CT Abdomen Pelvis With Contrast (Final result)  Result time 01/19/23 19:24:05      Final result by Ar Landaverde MD (01/19/23 19:24:05)                   Impression:      No acute process appreciated.  Possible very small fat containing left inguinal hernia.      Electronically signed by: Ar Landaverde  Date:    01/19/2023  Time:    19:24               Narrative:    EXAMINATION:  CT ABDOMEN PELVIS WITH CONTRAST    CLINICAL HISTORY:  left inguinal tenderness, Hx hernia repair with mesh;    TECHNIQUE:  CT imaging of the abdomen and pelvis after the administration of 100 mL of Isovue 370 intravenous contrast.  Oral contrast not administered. Dose length product 1344 mGycm. Automatic exposure control, adjustment of mA/kV or iterative reconstruction technique used to limit radiation dose.    COMPARISON:  CT 06/14/2022    FINDINGS:  Liver/biliary: 2 millimetric hepatic hypodensities remain stable, statistically benign.  No radiodense gallstones. No intra or extrahepatic biliary ductal dilation.    Pancreas: Normal.    Spleen: Normal.    Adrenals: Normal.    Genitourinary: Symmetric renal enhancement. No  hydronephrosis. Bladder decompressed and not well evaluated.  Normal prostate size.    Stomach/bowel: Normal caliber small bowel and colon.  Normal appendix. No definitive sites of bowel inflammation.    Lymph nodes/peritoneum: No pathologically enlarged lymph node identified. No ascites or free air. No fluid collection.    Vasculature: Mild aortic and iliac artery calcification.  No abdominal aortic aneurysm.    Abdominal wall: Small to moderate fat containing umbilical hernia.  Possible very small fat containing left inguinal hernia.  Postsurgical changes in the right inguinal region.    Lung bases: Stable 6 mm mean diameter solid nodule in the left lower lobe.  No pleural effusion.    Musculoskeletal: No acute osseous findings.                                       Medications   morphine injection 4 mg (4 mg Intravenous Given 1/19/23 1751)   ondansetron injection 4 mg (4 mg Intravenous Given 1/19/23 1751)   iopamidoL (ISOVUE-370) injection 100 mL (100 mLs Intravenous Given 1/19/23 1908)     Medical Decision Making:   Differential Diagnosis:   Constipation, diverticulitis, inguinal hernia, incarcerated hernia  Clinical Tests:   Lab Tests: Ordered and Reviewed  The following lab test(s) were unremarkable: CBC and CMP       <> Summary of Lab: No leukocytosis, normal renal function  Radiological Study: Ordered and Reviewed  ED Management:  Patient with out any leukocytosis.  He has moderate tenderness in the left lower inguinal region.  He has had previous inguinal hernia repair and has mesh.  CT scan shows a small hernia in the left inguinal region containing small amount of fat.  No bowel involvement.  Patient did get relief with IV morphine and Zofran.  Will discharge home with pain medication have him follow with his primary care physician for referral to surgery.                        Clinical Impression:   Final diagnoses:  [K40.90] Left inguinal hernia (Primary)        ED Disposition Condition    Discharge  Stable          ED Prescriptions       Medication Sig Dispense Start Date End Date Auth. Provider    HYDROcodone-acetaminophen (NORCO) 7.5-325 mg per tablet Take 1 tablet by mouth every 8 (eight) hours as needed for Pain. 12 tablet 1/19/2023 1/24/2023 MONIE Quach          Follow-up Information       Follow up With Specialties Details Why Contact Info    MONIE Vásquez Nurse Practitioner Call in 1 week  [address]  Suite [facility] [address]  [phone]               MONIE Quach  01/19/23 1937

## 2023-02-02 NOTE — PROGRESS NOTES
"  SUBJECTIVE:     History of Present Illness      Chief Complaint: Follow-up (Here for CT results from ER)    HPI:  Patient is a 47 y.o. year old male who presents to clinic for follow-up of hernia.    Patient reports past hernia repair back in September OL in [place].    States that he is still having some reoccurring pain.    He was recently seen in emergency room about 3 weeks ago.     He states at ER visit he had a CT of his stomach done and will like to get the disk to bring to his surgeon  on  this Thursday .    Review of Systems:    Review of Systems    12 point review of systems conducted, negative except as stated in the history of present illness. See HPI for details.     Previous History      Review of patient's allergies indicates:  No Known Allergies    Past Medical History:   Diagnosis Date    ED (erectile dysfunction)     Unilateral inguinal hernia, without obstruction or gangrene, not specified as recurrent     left     Current Outpatient Medications   Medication Instructions    gabapentin (NEURONTIN) 600 mg, Oral, 3 times daily    methocarbamoL (ROBAXIN) 750 mg, Oral, 2 times daily PRN    sildenafiL (VIAGRA) 100 MG tablet TAKE 1 TABLET BY MOUTH ONCE DAILY AS NEEDED PRIOR  TO  ACTIVITY     Past Surgical History:   Procedure Laterality Date    HERNIA REPAIR       Family History   Problem Relation Age of Onset    No Known Problems Mother     No Known Problems Father        Social History     Tobacco Use    Smoking status: Every Day     Types: Vaping with nicotine    Smokeless tobacco: Never    Tobacco comments:     refused   Substance Use Topics    Alcohol use: Never    Drug use: Not Currently     Types: Marijuana        Health Maintenance      Health Maintenance   Topic Date Due    Hepatitis C Screening  Never done    TETANUS VACCINE  Never done    Lipid Panel  06/20/2027       OBJECTIVE:     Physical Exam      Vital Signs Reviewed   Visit Vitals  /82   Pulse (!) 121   Resp 16   Ht 5' 11" " (1.803 m)   Wt 114.2 kg (251 lb 11.2 oz)   SpO2 98%   BMI 35.11 kg/m²       Physical Exam    Physical Exam:  General: Alert, well nourished, no acute distress, non-toxic appearing.   Eyes: Anicteric sclera, without conjunctival injection, normal lids, no purulent drainage, EOMs grossly intact.   Ears: No tragal tenderness. Tympanic membranes intact, pearly grey, without effusion or erythema and with a positive light reflex.   Mouth: Posterior pharynx without erythema. No exudate, ulcerations, or lesion. No tonsillar swelling.   Neck: Supple, full ROM, no rigidity, no cervical adenopathy.   Cardio: Normal rate and rhythm    Resp: Respirations even and unlabored, clear to auscultation bilaterally.   Abd: No ecchymosis or distension. Normal bowel sounds in all 4 quadrants. No tenderness to palpation. No rebound tenderness or guarding. No CVA tenderness.   Skin: No rashes or open lesions noted.   MSK: No swelling. No abrasions or signs of trauma. Ambulating without assistance.   Neuro: Alert,oriented No focal deficits noted. Facial expressions even.   Psych: Cooperative, Normal affect      Procedures    Procedures     Labs     Results for orders placed or performed during the hospital encounter of 01/19/23   Basic metabolic panel   Result Value Ref Range    Sodium Level 142 136 - 145 mmol/L    Potassium Level 4.1 3.5 - 5.1 mmol/L    Chloride 105 98 - 107 mmol/L    Carbon Dioxide 29 22 - 29 mmol/L    Glucose Level 74 74 - 100 mg/dL    Blood Urea Nitrogen 13.1 8.9 - 20.6 mg/dL    Creatinine 0.98 0.73 - 1.18 mg/dL    BUN/Creatinine Ratio 13     Calcium Level Total 9.5 8.4 - 10.2 mg/dL    Anion Gap 8.0 mEq/L    eGFR >60 mls/min/1.73/m2   CBC with Differential   Result Value Ref Range    WBC 8.6 4.5 - 11.5 x10(3)/mcL    RBC 5.00 4.70 - 6.10 x10(6)/mcL    Hgb 13.6 (L) 14.0 - 18.0 gm/dL    Hct 41.0 (L) 42.0 - 52.0 %    MCV 82.0 80.0 - 94.0 fL    MCH 27.2 pg    MCHC 33.2 33.0 - 36.0 mg/dL    RDW 12.6 11.5 - 17.0 %    Platelet  278 130 - 400 x10(3)/mcL    MPV 9.0 7.4 - 10.4 fL    Neut % 69.4 %    Lymph % 21.4 %    Mono % 7.2 %    Eos % 1.7 %    Basophil % 0.2 %    Lymph # 1.84 0.6 - 4.6 x10(3)/mcL    Neut # 5.95 2.1 - 9.2 x10(3)/mcL    Mono # 0.62 0.1 - 1.3 x10(3)/mcL    Eos # 0.15 0 - 0.9 x10(3)/mcL    Baso # 0.02 0 - 0.2 x10(3)/mcL    IG# 0.01 0 - 0.04 x10(3)/mcL    IG% 0.1 %       Chemistry:  Lab Results   Component Value Date     01/19/2023    K 4.1 01/19/2023    CHLORIDE 105 01/19/2023    BUN 13.1 01/19/2023    CREATININE 0.98 01/19/2023    EGFRNORACEVR >60 01/19/2023    GLUCOSE 74 01/19/2023    CALCIUM 9.5 01/19/2023    ALKPHOS 74 09/04/2022    LABPROT 7.5 09/04/2022    ALBUMIN 4.3 09/04/2022    BILIDIR 0.10 02/20/2019    IBILI 0.50 02/20/2019    AST 19 09/04/2022    ALT 24 09/04/2022    CKNTPKOY26EP 19.9 (L) 06/20/2022    TSH 1.6910 06/20/2022        Lab Results   Component Value Date    HGBA1C 4.3 06/20/2022        Hematology:  Lab Results   Component Value Date    WBC 8.6 01/19/2023    HGB 13.6 (L) 01/19/2023    HCT 41.0 (L) 01/19/2023     01/19/2023       Lipid Panel:  Lab Results   Component Value Date    CHOL 166 06/20/2022    HDL 69 (H) 06/20/2022    LDL 86.00 06/20/2022    TRIG 57 06/20/2022    TOTALCHOLEST 2 06/20/2022        Urine:  Lab Results   Component Value Date    COLORUA Yellow 09/04/2022    APPEARANCEUA Clear 09/04/2022    SGUA 1.020 09/04/2022    PHUA 6.5 09/04/2022    PROTEINUA Negative 09/04/2022    GLUCOSEUA Negative 09/04/2022    KETONESUA Negative 09/04/2022    BLOODUA Negative 09/04/2022    NITRITESUA Negative 09/04/2022    LEUKOCYTESUR Negative 09/04/2022    RBCUA <5 09/04/2022    WBCUA <5 09/04/2022    BACTERIA None Seen 09/04/2022         Assessment       1. Right inguinal hernia    2. Umbilical hernia without obstruction and without gangrene           Plan       1. Right inguinal hernia    2. Umbilical hernia without obstruction and without gangrene        Medication List with Changes/Refills    Current Medications    GABAPENTIN (NEURONTIN) 600 MG TABLET    Take 600 mg by mouth 3 (three) times daily.    METHOCARBAMOL (ROBAXIN) 750 MG TAB    Take 750 mg by mouth 2 (two) times daily as needed.    SILDENAFIL (VIAGRA) 100 MG TABLET    TAKE 1 TABLET BY MOUTH ONCE DAILY AS NEEDED PRIOR  TO  ACTIVITY   Discontinued Medications    GABAPENTIN (NEURONTIN) 300 MG CAPSULE    Take 1 capsule (300 mg total) by mouth 3 (three) times daily.       No follow-ups on file.     Future Appointments   Date Time Provider Department Center   5/15/2023  2:40 PM Tone Maxwell NP [facility] UROLOGY  [address]

## 2023-02-07 ENCOUNTER — OFFICE VISIT (OUTPATIENT)
Dept: FAMILY MEDICINE | Facility: CLINIC | Age: 48
End: 2023-02-07
Payer: MEDICAID

## 2023-02-07 VITALS
HEIGHT: 71 IN | HEART RATE: 121 BPM | OXYGEN SATURATION: 98 % | RESPIRATION RATE: 16 BRPM | SYSTOLIC BLOOD PRESSURE: 135 MMHG | WEIGHT: 251.69 LBS | DIASTOLIC BLOOD PRESSURE: 82 MMHG | BODY MASS INDEX: 35.23 KG/M2

## 2023-02-07 DIAGNOSIS — K42.9 UMBILICAL HERNIA WITHOUT OBSTRUCTION AND WITHOUT GANGRENE: ICD-10-CM

## 2023-02-07 DIAGNOSIS — K40.90 RIGHT INGUINAL HERNIA: Primary | ICD-10-CM

## 2023-02-07 PROCEDURE — 99213 PR OFFICE/OUTPT VISIT, EST, LEVL III, 20-29 MIN: ICD-10-PCS | Mod: ,,, | Performed by: NURSE PRACTITIONER

## 2023-02-07 PROCEDURE — 99213 OFFICE O/P EST LOW 20 MIN: CPT | Mod: ,,, | Performed by: NURSE PRACTITIONER

## 2023-02-07 PROCEDURE — 3079F PR MOST RECENT DIASTOLIC BLOOD PRESSURE 80-89 MM HG: ICD-10-PCS | Mod: CPTII,,, | Performed by: NURSE PRACTITIONER

## 2023-02-07 PROCEDURE — 1159F PR MEDICATION LIST DOCUMENTED IN MEDICAL RECORD: ICD-10-PCS | Mod: CPTII,,, | Performed by: NURSE PRACTITIONER

## 2023-02-07 PROCEDURE — 3075F PR MOST RECENT SYSTOLIC BLOOD PRESS GE 130-139MM HG: ICD-10-PCS | Mod: CPTII,,, | Performed by: NURSE PRACTITIONER

## 2023-02-07 PROCEDURE — 3008F PR BODY MASS INDEX (BMI) DOCUMENTED: ICD-10-PCS | Mod: CPTII,,, | Performed by: NURSE PRACTITIONER

## 2023-02-07 PROCEDURE — 3079F DIAST BP 80-89 MM HG: CPT | Mod: CPTII,,, | Performed by: NURSE PRACTITIONER

## 2023-02-07 PROCEDURE — 3008F BODY MASS INDEX DOCD: CPT | Mod: CPTII,,, | Performed by: NURSE PRACTITIONER

## 2023-02-07 PROCEDURE — 3075F SYST BP GE 130 - 139MM HG: CPT | Mod: CPTII,,, | Performed by: NURSE PRACTITIONER

## 2023-02-07 PROCEDURE — 1159F MED LIST DOCD IN RCRD: CPT | Mod: CPTII,,, | Performed by: NURSE PRACTITIONER

## 2023-02-07 RX ORDER — METHOCARBAMOL 750 MG/1
750 TABLET, FILM COATED ORAL 2 TIMES DAILY PRN
COMMUNITY
Start: 2023-01-10 | End: 2023-10-19

## 2023-02-07 RX ORDER — GABAPENTIN 600 MG/1
600 TABLET ORAL 3 TIMES DAILY
COMMUNITY
Start: 2022-09-15 | End: 2023-10-19

## 2023-04-28 ENCOUNTER — OFFICE VISIT (OUTPATIENT)
Dept: URGENT CARE | Facility: CLINIC | Age: 48
End: 2023-04-28
Payer: MEDICAID

## 2023-04-28 VITALS
DIASTOLIC BLOOD PRESSURE: 79 MMHG | HEART RATE: 103 BPM | OXYGEN SATURATION: 98 % | RESPIRATION RATE: 20 BRPM | SYSTOLIC BLOOD PRESSURE: 114 MMHG | WEIGHT: 251 LBS | HEIGHT: 71 IN | TEMPERATURE: 99 F | BODY MASS INDEX: 35.14 KG/M2

## 2023-04-28 DIAGNOSIS — K08.89 TOOTHACHE: Primary | ICD-10-CM

## 2023-04-28 DIAGNOSIS — N50.811 PAIN IN RIGHT TESTICLE: ICD-10-CM

## 2023-04-28 DIAGNOSIS — K02.9 DENTAL CARIES: ICD-10-CM

## 2023-04-28 DIAGNOSIS — G62.9 NEUROPATHY: ICD-10-CM

## 2023-04-28 PROCEDURE — 99203 PR OFFICE/OUTPT VISIT, NEW, LEVL III, 30-44 MIN: ICD-10-PCS | Mod: S$GLB,,, | Performed by: NURSE PRACTITIONER

## 2023-04-28 PROCEDURE — 99203 OFFICE O/P NEW LOW 30 MIN: CPT | Mod: S$GLB,,, | Performed by: NURSE PRACTITIONER

## 2023-04-28 RX ORDER — AMOXICILLIN 500 MG/1
TABLET, FILM COATED ORAL
Qty: 11 TABLET | Refills: 0 | Status: SHIPPED | OUTPATIENT
Start: 2023-04-28 | End: 2023-05-02

## 2023-04-28 NOTE — PROGRESS NOTES
"Subjective:      Patient ID: Erik Patel is a 48 y.o. male.    Vitals:  height is 5' 11" (1.803 m) and weight is 113.9 kg (251 lb). His temperature is 98.7 °F (37.1 °C). His blood pressure is 114/79 and his pulse is 103. His respiration is 20 and oxygen saturation is 98%.     Chief Complaint: Testicle Pain    48 year old male presents with chronic right testicle pain (constant burning sensation) since surgery 09/2022.  Denies testicular edema/no change from typical pain pattern. Patient reports that during surgery for a hernia mesh (Sept 16, 2022) they cut his right testicle vein. Reports chronic issues since surgery: burning and shrinkage of right testicle. Meloxicam, Gabapentin (800 mg TID/no relief/self-discontinued)    Additional complaints of toothache right upper tooth x 3-4 days. States that tooth broke and he noticed right facial swelling yesterday upon awakening.       Recent relocation from [place]. No established PCP in the area      Testicle Pain  The patient's primary symptoms include testicular pain (chronic right testicle neuropathy). The patient's pertinent negatives include no genital injury, genital itching, genital lesions, pelvic pain, penile discharge, priapism or scrotal swelling. This is a new problem. The current episode started more than 1 month ago. The problem occurs constantly. The problem has been unchanged. The pain is moderate. Pertinent negatives include no abdominal pain, anorexia, chest pain, chills, constipation, coughing, diarrhea, discolored urine, dysuria, fever, flank pain, frequency, headaches, hematuria, hesitancy, joint pain, joint swelling, nausea, painful intercourse, rash, shortness of breath, sore throat, urgency, urinary retention or vomiting. The testicular pain affects the right testicle. Testicle swelling: neither. The color of the testicles is Normal. The symptoms are aggravated by activity. He has tried nothing for the symptoms. The treatment provided no " relief. He is not sexually active. He never uses condoms. No, his partner does not have an STD. There is no history of BPH, chlamydia, cryptorchidism, erectile aid use, erectile dysfunction, a femoral hernia, gonorrhea, herpes simplex, HIV, an inguinal hernia, kidney stones, prostatitis, sickle cell disease, syphilis or varicocele.     Constitution: Negative. Negative for chills and fever.   HENT:  Positive for dental problem (toothache). Negative for sore throat.    Neck: neck negative.   Cardiovascular: Negative.  Negative for chest pain.   Eyes: Negative.    Respiratory:  Negative for cough and shortness of breath.    Gastrointestinal: Negative.  Negative for abdominal pain, nausea, vomiting, constipation and diarrhea.   Endocrine: negative.   Genitourinary:  Positive for testicular pain (chronic right testicle neuropathy). Negative for dysuria, frequency, urgency, flank pain, penile discharge, scrotal swelling and pelvic pain.   Skin: Negative.  Negative for rash.   Allergic/Immunologic: Negative.    Neurological: Negative.  Negative for headaches.   Hematologic/Lymphatic: Negative.     Objective:     Physical Exam   Constitutional: He is oriented to person, place, and time. He is cooperative.  Non-toxic appearance. He does not appear ill. No distress. awake  HENT:   Head: Normocephalic and atraumatic.   Mouth/Throat: Dental abscesses (right upper gum with several broken teeth, edema, erythema) and dental caries (severe broken teeth right upper) present. Posterior oropharyngeal erythema present.   Eyes: Conjunctivae are normal. Pupils are equal, round, and reactive to light. Right eye exhibits no discharge. Left eye exhibits no discharge. No scleral icterus. Extraocular movement intact   Neck: Neck supple.   Cardiovascular: Tachycardia present.   Abdominal: Normal appearance.   Musculoskeletal: Normal range of motion.         General: Normal range of motion.   Neurological: no focal deficit. He is alert and  oriented to person, place, and time.   Skin: Skin is warm, dry and not diaphoretic.   Psychiatric: His behavior is normal. Mood, judgment and thought content normal.   Nursing note and vitals reviewed.    Assessment:     1. Toothache    2. Pain in right testicle    3. Neuropathy    4. Dental caries        Plan:     Patient presents with toothache/poor dentition/caries. S/S dental abscess. Plan is to treat bacterial infection and f/u with dentist for chronic management.    Additional complaints of chronic right testicle neuropathy. Will establish with local PCP for management.      Toothache  -     amoxicillin (AMOXIL) 500 MG Tab; Take 2 tablets (1,000 mg total) by mouth once daily for 1 day, THEN 1 tablet (500 mg total) 3 (three) times daily for 3 days.  Dispense: 11 tablet; Refill: 0    Pain in right testicle    Neuropathy    Dental caries                Patient Instructions   Warm salt water gargles  Complete antibiotic course as directed  Tylenol OTC as directed for pain  Continue Meloxicam as directed for pain  Follow up with Dentist  Establish with Primary Care Provider

## 2023-04-28 NOTE — PATIENT INSTRUCTIONS
Warm salt water gargles  Complete antibiotic course as directed  Tylenol OTC as directed for pain  Continue Meloxicam as directed for pain  Follow up with Dentist  Establish with Primary Care Provider

## 2023-05-01 ENCOUNTER — TELEPHONE (OUTPATIENT)
Dept: URGENT CARE | Facility: CLINIC | Age: 48
End: 2023-05-01
Payer: MEDICAID

## 2023-05-01 ENCOUNTER — PATIENT MESSAGE (OUTPATIENT)
Dept: ADMINISTRATIVE | Facility: HOSPITAL | Age: 48
End: 2023-05-01
Payer: MEDICAID

## 2023-05-12 ENCOUNTER — OFFICE VISIT (OUTPATIENT)
Dept: URGENT CARE | Facility: CLINIC | Age: 48
End: 2023-05-12
Payer: MEDICAID

## 2023-05-12 VITALS
DIASTOLIC BLOOD PRESSURE: 61 MMHG | WEIGHT: 251 LBS | HEIGHT: 71 IN | OXYGEN SATURATION: 99 % | HEART RATE: 84 BPM | RESPIRATION RATE: 18 BRPM | SYSTOLIC BLOOD PRESSURE: 114 MMHG | TEMPERATURE: 97 F | BODY MASS INDEX: 35.14 KG/M2

## 2023-05-12 DIAGNOSIS — A53.9 SYPHILIS: Primary | ICD-10-CM

## 2023-05-12 PROCEDURE — 99213 OFFICE O/P EST LOW 20 MIN: CPT | Mod: S$GLB,,, | Performed by: NURSE PRACTITIONER

## 2023-05-12 PROCEDURE — 99213 PR OFFICE/OUTPT VISIT, EST, LEVL III, 20-29 MIN: ICD-10-PCS | Mod: S$GLB,,, | Performed by: NURSE PRACTITIONER

## 2023-05-12 RX ORDER — DOXYCYCLINE 100 MG/1
100 CAPSULE ORAL 2 TIMES DAILY
Qty: 28 CAPSULE | Refills: 0 | Status: SHIPPED | OUTPATIENT
Start: 2023-05-12 | End: 2023-05-26

## 2023-05-12 NOTE — PROGRESS NOTES
"Subjective:      Patient ID: Erik Patel is a 48 y.o. male.    Vitals:  height is 5' 11" (1.803 m) and weight is 113.9 kg (251 lb). His tympanic temperature is 96.9 °F (36.1 °C). His blood pressure is 114/61 and his pulse is 84. His respiration is 18 and oxygen saturation is 99%.     Chief Complaint: Exposure to STD        Patient is a 48-year-old male who presents to urgent care for treatment for Syphilis.  He reports donating plasma 5 days ago and was told by the blood bank agency that he tested positive for syphilis.  Patient denies any genital lesions or neurological symptoms to suggest latent syphilis.  He denies fever chills or body aches.  He reports mild right testicular pain however, this is not new for him and he is being treated by another provider for testicular pain.     Exposure to STD  The patient's primary symptoms include testicular pain. The patient's pertinent negatives include no genital injury, genital itching, genital lesions, pelvic pain, penile discharge, penile pain or scrotal swelling. This is a new problem. The current episode started in the past 7 days. The pain is mild. Pertinent negatives include no chills, discolored urine, fever, frequency or hematuria. The testicular pain affects the right testicle. He is sexually active. His past medical history is significant for syphilis.   Constitution: Negative for chills and fever.   Neck: neck negative.   Cardiovascular: Negative.    Eyes: Negative.    Respiratory: Negative.     Gastrointestinal: Negative.    Genitourinary:  Positive for testicular pain. Negative for frequency, penile discharge, penile pain, scrotal swelling and pelvic pain.    Objective:     Physical Exam   Constitutional: He is oriented to person, place, and time. He appears well-developed. He is cooperative.   HENT:   Head: Normocephalic and atraumatic.   Ears:   Right Ear: Hearing, tympanic membrane, external ear and ear canal normal.   Left Ear: Hearing, tympanic " membrane, external ear and ear canal normal.   Nose: Nose normal. No mucosal edema or nasal deformity. No epistaxis. Right sinus exhibits no maxillary sinus tenderness and no frontal sinus tenderness. Left sinus exhibits no maxillary sinus tenderness and no frontal sinus tenderness.   Mouth/Throat: Uvula is midline, oropharynx is clear and moist and mucous membranes are normal. No trismus in the jaw. Normal dentition. No uvula swelling.   Eyes: Conjunctivae and lids are normal.   Neck: Trachea normal and phonation normal. Neck supple.   Cardiovascular: Normal rate, regular rhythm, normal heart sounds and normal pulses.   Pulmonary/Chest: Effort normal and breath sounds normal.   Abdominal: Normal appearance and bowel sounds are normal. Soft.   Musculoskeletal: Normal range of motion.         General: Normal range of motion.   Neurological: He is alert and oriented to person, place, and time. He exhibits normal muscle tone.   Skin: Skin is warm, dry and intact.   Psychiatric: His speech is normal and behavior is normal. Judgment and thought content normal.   Nursing note and vitals reviewed.    Assessment:     1. Syphilis        Plan:       Syphilis  -     doxycycline (VIBRAMYCIN) 100 MG Cap; Take 1 capsule (100 mg total) by mouth 2 (two) times daily. for 14 days  Dispense: 28 capsule; Refill: 0  -     Cancel: Ambulatory referral/consult to Family Practice  -     Ambulatory referral/consult to Family Practice    Other orders  -     Discontinue: penicillin G benzathine (BICILLIN [place]) injection 2.4 Million Units                  Patient presents to urgent care for evaluation and treatment for syphilis.  He reports donated plasma and was told at that time he tested positive for syphilis.  Patient denies genital lesions or neurological symptoms.  He did inquire about penicillin treatment however I informed him we do not have penicillin and our clinic.  Patient wished to be treated with p.o. antibiotic.  Strongly recommend  patient establish himself with a PCP.

## 2023-05-15 ENCOUNTER — TELEPHONE (OUTPATIENT)
Dept: URGENT CARE | Facility: CLINIC | Age: 48
End: 2023-05-15
Payer: MEDICAID

## 2023-06-05 ENCOUNTER — TELEPHONE (OUTPATIENT)
Dept: UROLOGY | Facility: CLINIC | Age: 48
End: 2023-06-05
Payer: MEDICAID

## 2023-06-05 NOTE — TELEPHONE ENCOUNTER
----- Message from Emmie Munguia sent at 6/5/2023 10:12 AM CDT -----  Regarding: Medication  Contact: patient  Per phone call with patient, he would like for the first medication of Trimix  so he can afford it.  It was the medication you prescribe him.  Please return call at [phone] (home).    Thanks,  SJ

## 2023-06-05 NOTE — TELEPHONE ENCOUNTER
Pt advised his missed his yrly appointment. He states he now lives in [place] and it would be to far to come for one. I advised pt to get a urologist in [place] or [place] since he lives there now.

## 2023-06-09 ENCOUNTER — TELEPHONE (OUTPATIENT)
Dept: UROLOGY | Facility: CLINIC | Age: 48
End: 2023-06-09
Payer: MEDICAID

## 2023-06-09 DIAGNOSIS — N52.9 ERECTILE DYSFUNCTION, UNSPECIFIED ERECTILE DYSFUNCTION TYPE: Primary | ICD-10-CM

## 2023-06-09 NOTE — TELEPHONE ENCOUNTER
Patient moved to [place], [place] and needs a referral to a new urologist sent there.         Internal referral sent on today 6/9/23

## 2023-06-09 NOTE — TELEPHONE ENCOUNTER
----- Message from Gabby Rees sent at 6/9/2023 10:24 AM CDT -----  Contact: self  Pt needs to referral pls call [phone], with any questions

## 2023-06-27 NOTE — FIRST PROVIDER EVALUATION
"Medical screening exam completed.  I have conducted a focused provider triage encounter, findings are as follows:    Brief history of present illness:  47 year old male presents to ED for right groin pain for the past several months; reports hx of hernia; denies dysuira, injury/trauma     Vitals:    09/04/22 1156   BP: (!) 145/80   BP Location: Left arm   Patient Position: Sitting   Pulse: 87   Resp: 18   Temp: 97.9 °F (36.6 °C)   TempSrc: Oral   SpO2: 99%   Weight: 105.2 kg (232 lb)   Height: 5' 11" (1.803 m)       Pertinent physical exam:  awake, alert, no acute distress     Brief workup plan:  cbc, cmp, lipase, ua     Preliminary workup initiated; this workup will be continued and followed by the physician or advanced practice provider that is assigned to the patient when roomed.  " Drysol Counseling:  I discussed with the patient the risks of drysol/aluminum chloride including but not limited to skin rash, itching, irritation, burning.

## 2023-10-19 ENCOUNTER — OFFICE VISIT (OUTPATIENT)
Dept: UROLOGY | Facility: CLINIC | Age: 48
End: 2023-10-19
Payer: MEDICAID

## 2023-10-19 VITALS
SYSTOLIC BLOOD PRESSURE: 124 MMHG | WEIGHT: 251 LBS | DIASTOLIC BLOOD PRESSURE: 85 MMHG | BODY MASS INDEX: 35.14 KG/M2 | HEART RATE: 81 BPM | HEIGHT: 71 IN

## 2023-10-19 DIAGNOSIS — N52.9 ERECTILE DYSFUNCTION, UNSPECIFIED ERECTILE DYSFUNCTION TYPE: Primary | ICD-10-CM

## 2023-10-19 PROCEDURE — 3074F PR MOST RECENT SYSTOLIC BLOOD PRESSURE < 130 MM HG: ICD-10-PCS | Mod: CPTII,S$GLB,, | Performed by: UROLOGY

## 2023-10-19 PROCEDURE — 1159F MED LIST DOCD IN RCRD: CPT | Mod: CPTII,S$GLB,, | Performed by: UROLOGY

## 2023-10-19 PROCEDURE — 3008F BODY MASS INDEX DOCD: CPT | Mod: CPTII,S$GLB,, | Performed by: UROLOGY

## 2023-10-19 PROCEDURE — 3008F PR BODY MASS INDEX (BMI) DOCUMENTED: ICD-10-PCS | Mod: CPTII,S$GLB,, | Performed by: UROLOGY

## 2023-10-19 PROCEDURE — 99214 OFFICE O/P EST MOD 30 MIN: CPT | Mod: S$GLB,,, | Performed by: UROLOGY

## 2023-10-19 PROCEDURE — 3079F PR MOST RECENT DIASTOLIC BLOOD PRESSURE 80-89 MM HG: ICD-10-PCS | Mod: CPTII,S$GLB,, | Performed by: UROLOGY

## 2023-10-19 PROCEDURE — 99214 PR OFFICE/OUTPT VISIT, EST, LEVL IV, 30-39 MIN: ICD-10-PCS | Mod: S$GLB,,, | Performed by: UROLOGY

## 2023-10-19 PROCEDURE — 3074F SYST BP LT 130 MM HG: CPT | Mod: CPTII,S$GLB,, | Performed by: UROLOGY

## 2023-10-19 PROCEDURE — 1159F PR MEDICATION LIST DOCUMENTED IN MEDICAL RECORD: ICD-10-PCS | Mod: CPTII,S$GLB,, | Performed by: UROLOGY

## 2023-10-19 PROCEDURE — 3079F DIAST BP 80-89 MM HG: CPT | Mod: CPTII,S$GLB,, | Performed by: UROLOGY

## 2023-10-19 RX ORDER — SILDENAFIL 100 MG/1
100 TABLET, FILM COATED ORAL DAILY PRN
Qty: 30 TABLET | Refills: 11 | Status: SHIPPED | OUTPATIENT
Start: 2023-10-19

## 2023-10-19 RX ORDER — CLINDAMYCIN HYDROCHLORIDE 150 MG/1
CAPSULE ORAL
COMMUNITY
Start: 2023-10-10

## 2023-10-19 RX ORDER — MELOXICAM 15 MG/1
TABLET ORAL
COMMUNITY
Start: 2023-10-07

## 2023-10-19 NOTE — PROGRESS NOTES
Subjective:       Patient ID: Erik Patel is a 48 y.o. male.    Chief Complaint: Erectile Dysfunction      HPI: 39-year-old male, established patient. Patient has erectile dysfunction.  He previously failed oral medications. Patient has done well on TriMix injections.  He was started on 30/0.5/10. No other urinary complaints at this time.      Erectile Dysfunction  Irritative symptoms do not include frequency or urgency. Pertinent negatives include no dysuria or hematuria.        Past Medical History:   Past Medical History:   Diagnosis Date    ED (erectile dysfunction)     Unilateral inguinal hernia, without obstruction or gangrene, not specified as recurrent     left       Past Surgical Historical:   Past Surgical History:   Procedure Laterality Date    HERNIA REPAIR          Medications:   Medication List with Changes/Refills   New Medications    SILDENAFIL (VIAGRA) 100 MG TABLET    Take 1 tablet (100 mg total) by mouth daily as needed for Erectile Dysfunction.   Current Medications    CLINDAMYCIN (CLEOCIN) 150 MG CAPSULE    take one capsule BY MOUTH EVERY 4 HOURS (no more THAN four doses daily) FOR ten DAYS    MELOXICAM (MOBIC) 15 MG TABLET       Discontinued Medications    GABAPENTIN (NEURONTIN) 600 MG TABLET    Take 600 mg by mouth 3 (three) times daily.    METHOCARBAMOL (ROBAXIN) 750 MG TAB    Take 750 mg by mouth 2 (two) times daily as needed.    SILDENAFIL (VIAGRA) 100 MG TABLET    TAKE 1 TABLET BY MOUTH ONCE DAILY AS NEEDED PRIOR  TO  ACTIVITY        Past Social History:   Social History     Socioeconomic History    Marital status: Single   Occupational History     Comment: Unemployed   Tobacco Use    Smoking status: Former     Types: Vaping with nicotine    Smokeless tobacco: Never    Tobacco comments:     refused   Substance and Sexual Activity    Alcohol use: Never    Drug use: Not Currently     Types: Marijuana    Sexual activity: Not Currently       Allergies: Review of patient's allergies  indicates:  No Known Allergies     Family History:   Family History   Problem Relation Age of Onset    No Known Problems Mother     No Known Problems Father         Review of Systems:  Review of Systems   Constitutional:  Negative for activity change and appetite change.   HENT:  Negative for congestion and dental problem.    Respiratory:  Negative for chest tightness and shortness of breath.    Cardiovascular:  Negative for chest pain.   Gastrointestinal:  Negative for abdominal distention and abdominal pain.   Genitourinary:  Negative for decreased urine volume, difficulty urinating, dysuria, enuresis, flank pain, frequency, genital sores, hematuria, penile discharge, penile pain, penile swelling, scrotal swelling, testicular pain and urgency.   Musculoskeletal:  Negative for back pain and neck pain.   Neurological:  Negative for dizziness.   Hematological:  Negative for adenopathy.   Psychiatric/Behavioral:  Negative for agitation, behavioral problems and confusion.        Physical Exam:  Physical Exam  Vitals and nursing note reviewed.   Constitutional:       Appearance: He is well-developed.   HENT:      Head: Normocephalic.   Cardiovascular:      Rate and Rhythm: Normal rate and regular rhythm.      Heart sounds: Normal heart sounds.   Pulmonary:      Effort: Pulmonary effort is normal.      Breath sounds: Normal breath sounds.   Abdominal:      General: Bowel sounds are normal.      Palpations: Abdomen is soft.   Skin:     General: Skin is warm and dry.   Neurological:      Mental Status: He is alert and oriented to person, place, and time.       Assessment/Plan:   Erectile dysfunction:  Patient has been on TriMix injections.  He is on low-dose, 30/0.5/10.  Patient recently relocated to [place] and would like a referral to Urology    Will plan follow-up in 1 year, sooner if needed.  Problem List Items Addressed This Visit    None

## 2023-11-20 ENCOUNTER — TELEPHONE (OUTPATIENT)
Dept: UROLOGY | Facility: CLINIC | Age: 48
End: 2023-11-20
Payer: MEDICAID

## 2023-11-20 NOTE — TELEPHONE ENCOUNTER
Called and spoke to Aracely at [facility], she wanted to verify the dosage amount of TriMix. She just wanted to make sure it was the correct dosage for the patient    ----- Message from Ellen Daniels sent at 11/20/2023 10:48 AM CST -----  Type:  Needs Medical Advice    Who Called:Aracely w/[facility]   Symptoms (please be specific): -   How long has patient had these symptoms:  -  Pharmacy name and phone #:    Would the patient rather a call back or a response via [facility]?    Best Call Back Number: [phone]  Additional Information:  needs to speak w/ nurse about a script pt just brought in TriMix, please call

## 2023-12-04 ENCOUNTER — HOSPITAL ENCOUNTER (OUTPATIENT)
Dept: RADIOLOGY | Facility: CLINIC | Age: 48
Discharge: HOME | End: 2023-12-04
Attending: NURSE PRACTITIONER
Payer: MEDICAID

## 2023-12-04 ENCOUNTER — OFFICE VISIT (OUTPATIENT)
Dept: URGENT CARE | Facility: CLINIC | Age: 48
End: 2023-12-04
Payer: MEDICAID

## 2023-12-04 VITALS
HEIGHT: 71 IN | TEMPERATURE: 98 F | SYSTOLIC BLOOD PRESSURE: 134 MMHG | HEART RATE: 78 BPM | BODY MASS INDEX: 35.14 KG/M2 | DIASTOLIC BLOOD PRESSURE: 74 MMHG | WEIGHT: 251 LBS | OXYGEN SATURATION: 98 % | RESPIRATION RATE: 20 BRPM

## 2023-12-04 DIAGNOSIS — F17.290 VAPING NICOTINE DEPENDENCE, TOBACCO PRODUCT: ICD-10-CM

## 2023-12-04 DIAGNOSIS — J06.9 VIRAL URI WITH COUGH: ICD-10-CM

## 2023-12-04 DIAGNOSIS — J06.9 VIRAL URI WITH COUGH: Primary | ICD-10-CM

## 2023-12-04 DIAGNOSIS — R09.81 NASAL CONGESTION: ICD-10-CM

## 2023-12-04 LAB
CTP QC/QA: YES
CTP QC/QA: YES
POC MOLECULAR INFLUENZA A AGN: NEGATIVE
POC MOLECULAR INFLUENZA B AGN: NEGATIVE
SARS-COV-2 AG RESP QL IA.RAPID: NEGATIVE

## 2023-12-04 PROCEDURE — 87811 SARS CORONAVIRUS 2 ANTIGEN POCT, MANUAL READ: ICD-10-PCS | Mod: QW,S$GLB,, | Performed by: NURSE PRACTITIONER

## 2023-12-04 PROCEDURE — 87502 INFLUENZA DNA AMP PROBE: CPT | Mod: QW,S$GLB,, | Performed by: NURSE PRACTITIONER

## 2023-12-04 PROCEDURE — 87811 SARS-COV-2 COVID19 W/OPTIC: CPT | Mod: QW,S$GLB,, | Performed by: NURSE PRACTITIONER

## 2023-12-04 PROCEDURE — 99214 PR OFFICE/OUTPT VISIT, EST, LEVL IV, 30-39 MIN: ICD-10-PCS | Mod: S$GLB,,, | Performed by: NURSE PRACTITIONER

## 2023-12-04 PROCEDURE — 71046 XR CHEST PA AND LATERAL: ICD-10-PCS | Mod: S$GLB,,, | Performed by: RADIOLOGY

## 2023-12-04 PROCEDURE — 71046 X-RAY EXAM CHEST 2 VIEWS: CPT | Mod: S$GLB,,, | Performed by: RADIOLOGY

## 2023-12-04 PROCEDURE — 87502 POCT INFLUENZA A/B MOLECULAR: ICD-10-PCS | Mod: QW,S$GLB,, | Performed by: NURSE PRACTITIONER

## 2023-12-04 PROCEDURE — 99214 OFFICE O/P EST MOD 30 MIN: CPT | Mod: S$GLB,,, | Performed by: NURSE PRACTITIONER

## 2023-12-04 RX ORDER — BENZONATATE 200 MG/1
200 CAPSULE ORAL 3 TIMES DAILY PRN
Qty: 30 CAPSULE | Refills: 0 | Status: SHIPPED | OUTPATIENT
Start: 2023-12-04 | End: 2023-12-14

## 2023-12-04 RX ORDER — IPRATROPIUM BROMIDE 21 UG/1
2 SPRAY, METERED NASAL 3 TIMES DAILY PRN
Qty: 30 ML | Refills: 0 | Status: SHIPPED | OUTPATIENT
Start: 2023-12-04

## 2023-12-04 RX ORDER — ALBUTEROL SULFATE 90 UG/1
2 AEROSOL, METERED RESPIRATORY (INHALATION) EVERY 6 HOURS PRN
Qty: 18 G | Refills: 1 | Status: SHIPPED | OUTPATIENT
Start: 2023-12-04

## 2023-12-04 NOTE — PROGRESS NOTES
"Subjective:      Patient ID: Erik Patel is a 48 y.o. male.    Vitals:  height is 5' 11" (1.803 m) and weight is 113.9 kg (251 lb). His temperature is 98.2 °F (36.8 °C). His blood pressure is 134/74 and his pulse is 78. His respiration is 20 and oxygen saturation is 98%.     Chief Complaint: Nasal Congestion    Patient presents with cough nasal congestion lethargy.  Symptoms started Friday.  Taking tylenol  tylenol cold and flu  has not measured if he had fever  Scratchy throat    Sinus Problem  This is a new problem. The current episode started in the past 7 days. The problem is unchanged. There has been no fever. His pain is at a severity of 6/10. The pain is moderate. Associated symptoms include chills, congestion, coughing, ear pain, sinus pressure and a sore throat. Pertinent negatives include no diaphoresis, headaches, hoarse voice, neck pain, shortness of breath, sneezing or swollen glands. Past treatments include acetaminophen. The treatment provided mild relief.       Constitution: Positive for chills. Negative for sweating.   HENT:  Positive for ear pain, congestion, sinus pressure and sore throat.    Neck: Negative for neck pain.   Respiratory:  Positive for cough. Negative for shortness of breath.    Allergic/Immunologic: Negative for sneezing.   Neurological:  Negative for headaches.      Objective:     Vitals:    12/04/23 1043   BP: 134/74   BP Location: Right arm   Patient Position: Sitting   BP Method: Large (Automatic)   Pulse: 78   Resp: 20   Temp: 98.2 °F (36.8 °C)   SpO2: 98%   Weight: 113.9 kg (251 lb)   Height: 5' 11" (1.803 m)       Physical Exam   Constitutional: He is oriented to person, place, and time. He appears well-developed. He is cooperative.  Non-toxic appearance. He does not appear ill. No distress. obesity  HENT:   Head: Normocephalic and atraumatic.   Ears:   Right Ear: Hearing, tympanic membrane, external ear and ear canal normal.   Left Ear: Hearing, tympanic membrane, " external ear and ear canal normal.   Nose: Rhinorrhea and congestion present. No mucosal edema or nasal deformity. No epistaxis. Right sinus exhibits no maxillary sinus tenderness and no frontal sinus tenderness. Left sinus exhibits no maxillary sinus tenderness and no frontal sinus tenderness.   Mouth/Throat: Uvula is midline, oropharynx is clear and moist and mucous membranes are normal. Mucous membranes are moist. No trismus in the jaw. Normal dentition. No uvula swelling. No oropharyngeal exudate, posterior oropharyngeal edema or posterior oropharyngeal erythema.   Eyes: Conjunctivae and lids are normal. Pupils are equal, round, and reactive to light. No scleral icterus. Extraocular movement intact   Neck: Trachea normal and phonation normal. Neck supple. No edema present. No erythema present. No neck rigidity present.   Cardiovascular: Normal rate, regular rhythm, normal heart sounds and normal pulses.   Pulmonary/Chest: Effort normal and breath sounds normal. No respiratory distress. He has no decreased breath sounds. He has no rhonchi.   Abdominal: Normal appearance.   Musculoskeletal: Normal range of motion.         General: No deformity. Normal range of motion.   Neurological: He is alert and oriented to person, place, and time. He exhibits normal muscle tone. Coordination normal.   Skin: Skin is warm, dry, intact, not diaphoretic and not pale.   Psychiatric: His speech is normal and behavior is normal. Judgment and thought content normal.   Nursing note and vitals reviewed.      Assessment:     1. Viral URI with cough    2. Nasal congestion    3. Vaping nicotine dependence, tobacco product      Results for orders placed or performed in visit on 12/04/23   POCT Influenza A/B MOLECULAR   Result Value Ref Range    POC Molecular Influenza A Ag Negative Negative, Not Reported    POC Molecular Influenza B Ag Negative Negative, Not Reported     Acceptable Yes    SARS Coronavirus 2 Antigen, POCT  Manual Read   Result Value Ref Range    SARS Coronavirus 2 Antigen Negative Negative     Acceptable Yes          Plan:     Patient stable for discharge and home management of condition    Viral URI with cough  -     XR CHEST PA AND LATERAL; Future; Expected date: 12/04/2023  -     albuterol (VENTOLIN HFA) 90 mcg/actuation inhaler; Inhale 2 puffs into the lungs every 6 (six) hours as needed for Wheezing. Inhaler  Dispense: 18 g; Refill: 1  -     ipratropium (ATROVENT) 21 mcg (0.03 %) nasal spray; 2 sprays by Each Nostril route 3 (three) times daily as needed for Rhinitis.  Dispense: 30 mL; Refill: 0  -     benzonatate (TESSALON) 200 MG capsule; Take 1 capsule (200 mg total) by mouth 3 (three) times daily as needed for Cough.  Dispense: 30 capsule; Refill: 0    Nasal congestion  -     POCT Influenza A/B MOLECULAR  -     SARS Coronavirus 2 Antigen, POCT Manual Read    Vaping nicotine dependence, tobacco product  -     Ambulatory referral/consult to Smoking Cessation Program        Patient Instructions   Nicotine cessation referral entered     Increase fluids Get plenty of rest.   General infection control measures--cover mouth with sneezing coughing, wash hands frequently   Rest activity ad mark   Tylenol or advil per package directions for fever body aches   Supportive care measures   Warm salt water gargles for throat comfort  OTC cough suspension   Tessalon perles as needed for cough--less sedating than OTC cough suspension   Viral infections usually resolve in 7-10 days; If symptoms persist or worsen follow up UC or PCP   Repeat Covid testing as needed

## 2023-12-04 NOTE — PATIENT INSTRUCTIONS
Nicotine cessation referral entered     Increase fluids Get plenty of rest.   General infection control measures--cover mouth with sneezing coughing, wash hands frequently   Rest activity ad mark   Tylenol or advil per package directions for fever body aches   Supportive care measures   Warm salt water gargles for throat comfort  OTC cough suspension   Tessalon perles as needed for cough--less sedating than OTC cough suspension   Viral infections usually resolve in 7-10 days; If symptoms persist or worsen follow up UC or PCP   Repeat Covid testing as needed

## 2023-12-06 ENCOUNTER — TELEPHONE (OUTPATIENT)
Dept: URGENT CARE | Facility: CLINIC | Age: 48
End: 2023-12-06
Payer: MEDICAID

## 2023-12-06 NOTE — TELEPHONE ENCOUNTER
Pt wife came into today stating that the insurance isn't covering one of the medications that was giving to the pt. The pt is asking if anything else could be called in.

## 2024-03-08 ENCOUNTER — TELEPHONE (OUTPATIENT)
Dept: UROLOGY | Facility: CLINIC | Age: 49
End: 2024-03-08
Payer: MEDICAID

## 2024-03-08 NOTE — TELEPHONE ENCOUNTER
Spoke with pt's spouse, no ioc form on file but she wanted pt's records. I advised pt would need to fill out record request form. She states they live in [place] so I advised they should be be able to get form at a local [facility] facility there.

## 2024-03-08 NOTE — TELEPHONE ENCOUNTER
----- Message from Mehnaz Mckeon sent at 3/8/2024 12:35 PM CST -----  Contact: Laila(wife)  Laila called to consult with nurse or staff regarding the patient. She didn't want to state what the call was regarding but would like a call back. She can be reached at [phone]. Thanks/

## 2024-03-22 ENCOUNTER — TELEPHONE (OUTPATIENT)
Dept: UROLOGY | Facility: CLINIC | Age: 49
End: 2024-03-22
Payer: MEDICAID

## 2024-03-22 NOTE — TELEPHONE ENCOUNTER
----- Message from Zahra Todd sent at 3/22/2024  1:57 PM CDT -----  Contact: Laila Ulloa needs a call back at [phone], Regards to his medical records.    Thanks  td

## 2024-03-22 NOTE — TELEPHONE ENCOUNTER
Spoke with pt's spouse. While she was able to get a record request form from an [facility] facility in [place] they told her there was nothing else they could do. I apologized for the issue and suggested since they have it and it is filled out just fax it to us. We have staff that will get the fax and then turn it over to right staff member for record request.

## 2024-03-26 ENCOUNTER — TELEPHONE (OUTPATIENT)
Dept: UROLOGY | Facility: CLINIC | Age: 49
End: 2024-03-26
Payer: MEDICAID

## 2024-03-26 NOTE — TELEPHONE ENCOUNTER
----- Message from Janett Griggs MA sent at 3/26/2024  3:21 PM CDT -----  Contact: handy    ----- Message -----  From: Gabby Rees  Sent: 3/26/2024  12:32 PM CDT  To: [facility] Staff    Type:  Patient Returning Call    Who Called:handy  Who Left Message for Patient:unsure  Does the patient know what this is regarding?:medical records release-receipt  Would the patient rather a call back or a response via [facility]?   Best Call Back Number:[phone]  Additional Information: n/a

## 2024-03-26 NOTE — TELEPHONE ENCOUNTER
Pt's spouse (not on IOC) reports they faxed record request last Friday, 3/22/24. I explained record request can take days/weeks depending on what all pt signed for from his records.

## 2024-03-28 ENCOUNTER — TELEPHONE (OUTPATIENT)
Dept: UROLOGY | Facility: CLINIC | Age: 49
End: 2024-03-28
Payer: MEDICAID

## 2024-03-28 NOTE — TELEPHONE ENCOUNTER
----- Message from Dorothea Mcrae MA sent at 3/28/2024  3:30 PM CDT -----  Contact: Laila (spouse)    ----- Message -----  From: Emma Eller  Sent: 3/28/2024   3:27 PM CDT  To: [facility] Staff    Patient is requesting a call back regarding paperwork that was faxed. Please call back at [phone]